# Patient Record
Sex: MALE | Race: WHITE | Employment: OTHER | URBAN - METROPOLITAN AREA
[De-identification: names, ages, dates, MRNs, and addresses within clinical notes are randomized per-mention and may not be internally consistent; named-entity substitution may affect disease eponyms.]

---

## 2018-07-20 NOTE — PROGRESS NOTES
Summary and Recommendations:     Parkinsonism - not clear if there is a specific side of onset - right possibly but he has features that appear to be more pronounced on the left.     Discussed tests  1. Dopamine scan (Datscan)  2. Brain mri  3. Neck mri    Will wait on these tests for now    Will proceed with big and loud therapy through [facility]/delano colon as it close to them as they are living in [facility]    No medication for now.     Will see again in [place]    Discussed medication class, etc.     Reji Arriaza MD  _____________________________________________________________________  PATIENT: Mervin Caraballo  77 year old male   : 1940  DEMARCO: 2018    Consult requested by PATIENT/FAMILY    Outside records reviewed and revealed ---    History obtained from patient    History of Present Illness  76 yo right handed MAN here for an evaluation for parkinson from orthopedic surgeon  Nephew is a  and thought he should be seen    Lives in [facility]    He has had a little bit of tremor in both hands. He has a bit that started in the right hand  He walks slowly  He finished his physical therapy and working on balance at home  Tries to go to the gym 3 times per week  He does shuffle possibly per family - then when asked again his wife states he does not shuffle and that he does not clearly dragged an arm or leg.   He did heavy construction bulldozer work.   He has decreased hearing  Not clear if his voice has changed.   Smell perception - has been affected  Vision - wears glasses. Left cataract surgery has been done and is going to have to right eye done  Snores at night. He has had sleep study done. He may have sleep apnea  He is not using cpap. Supposedly the apnea is marginal  Denies depression or anxiety  Skin - no cancer but may have a lesion removed from his left ear that was precancerous  Has cough with lisinopril  No infections  Denies diabetes - but may be prediabetic   5.7 A1c  Not on thyroid  Medication but on cholesterol medication  He has atrial fibrillation and is on coumadin/warfarin  Blood has been okay - 2.6 inr but is anemia 11.7 has been low in the past.   Had a colonoscopy recently and was okay  Recent lipid panel was fine on the 12th of June 2018  Prior polyp and no recent polyps  No prostate cancer and gets up 3 to 6 times per night.   No comment on prostate size  Had one fall  He has a cane for his hip issue. He had a hip fracture from a fall feb 27, 2018  This was on gravel in [place]   He as walking on a hill and lost his balance and went to grab onto an airfilter on a tractor.   Rent a house in [place] for a month or so.   He has been taking flecainide and metoprolol for his  Heart  He is taking calcium  Denies constipation and not taking a stool softener  Denies smoking for 40 yrs.   No lung disease.     Medications            Calcium carbonate 1500mg or 600mg        Flecainide tambocor 100mg        Furosemide lasix 20mg Not taking       Krill oil 500mg        Megared omega-3 krill oil 500mg or 750mg ?       Metoprolol succinate toprol-xl 25mg 24 hr tablet         MVI ocuvite Not taking       Rosuvastatin crestor 10mg        Warfarin coumadin 5mg                                                                                                                                  [facility]  Result Narrative   Clinical History: Head pain/problem.    Technique: Noncontrast axial CT images are obtained of the brain.    Comparison: None.    Findings: No hyperdense hemorrhage. Nothing for subarachnoid, subdural, or epidural blood products. No mass, mass effect, or midline shift. Mild chronic ischemic change in the deep white matter of both cerebral hemispheres is noted. No acute orbital abnormality. Mild chronic ischemic change in the periventricular deep white matter of both cerebral hemispheres is noted. Vascular  calcification. Bone algorithm was reviewed and shows no fracture of the calvarium. There is an overlapping healed fracture left zygomatic arch which has a chronic appearance. No comparison studies are available. Sinuses and mastoid air cells are clear.    Impression: No acute intracranial process. See above.  [facility]   Status         14 Review of systems  are negative except for There is no problem list on file for this patient.     Allergies not on file  No past surgical history on file.  History reviewed. No pertinent past medical history.  Social History     Social History     Marital status:      Spouse name: N/A     Number of children: N/A     Years of education: N/A     Occupational History     Not on file.     Social History Main Topics     Smoking status: Not on file     Smokeless tobacco: Not on file     Alcohol use Not on file     Drug use: Not on file     Sexual activity: Not on file     Other Topics Concern     Not on file     Social History Narrative    LIVES IN [place].  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  No current outpatient prescriptions on file.     Examination  B/P: Data Unavailable, T: Data Unavailable, P: Data Unavailable, R: Data Unavailable 0 lbs 0 oz  There were no vitals taken for this visit., There is no height or weight on file to calculate BMI.    Vitals signs were added and reviewed if not above. Please refer to the chart from this visit.    General examination: well developed, nourished and normal affect  Carotid: No bruits. Chest CTA, Heart regular without gallops or murmurs. Abdomen soft nontender, no masses, bowel sounds intact. Periphery: normal pulses without edema. No skin lesions. MENTAL STATUS:  Alert, oriented x3.  Speech fluent with normal naming, repetition, comprehension.  Good right-left orientation, Can remember 3/3 objects. DLUOW on spelling world backwards.   CRANIAL NERVES:  Disks flat. Pupils are equal, round,  reactive to light.  Normal vascularity and fields. Extraocular movements full.  Facial sensation and movement normal.  Hearing intact. Palate moves symmetrically.  Tongue midline.  Sternocleidomastoid and trapezius strength intact.  Neck strength was normal.  NEUROLOGIC:  Tone: increased in both arms and legs 2 and has bradykinesias on finger tapping bilateral. Motor in upper and lower extremities. 5/5.  Reflexes 3/4. Except absent ankles  Toe signs downgoing.  Good finger-nose-finger, fine finger movement, heel-shin maneuver, sensation to light touch, position sense and vibration and temperature was abnormal with absent vib at toes and possibly reduced at the ankle and distal pp/lt loss at ankle. Gait normal except for has some unsteadiness due to hip issue but steps are pretty good with occasional reduction in arm swing right and then left. Romberg and postural stability  Intact.  Tremor  - mild resting tremor bilateral and has a min postural and action tremor.       Answers for HPI/ROS submitted by the patient on 7/22/2018   General Symptoms: Yes  Skin Symptoms: No  HENT Symptoms: No  EYE SYMPTOMS: Yes  HEART SYMPTOMS: Yes  LUNG SYMPTOMS: Yes  INTESTINAL SYMPTOMS: No  URINARY SYMPTOMS: Yes  REPRODUCTIVE SYMPTOMS: No  SKELETAL SYMPTOMS: Yes  BLOOD SYMPTOMS: Yes  NERVOUS SYSTEM SYMPTOMS: Yes  MENTAL HEALTH SYMPTOMS: Yes  Fever: No  Loss of appetite: No  Weight loss: No  Weight gain: Yes  Fatigue: Yes  Night sweats: No  Chills: No  Increased stress: No  Excessive hunger: No  Excessive thirst: No  Feeling hot or cold when others believe the temperature is normal: No  Loss of height: No  Post-operative complications: No  Surgical site pain: No  Hallucinations: Yes  Change in or Loss of Energy: Yes  Hyperactivity: No  Confusion: Yes  Eye pain: No  Vision loss: No  Dry eyes: No  Watery eyes: No  Eye bulging: No  Double vision: Yes  Flashing of lights: No  Spots: Yes  Floaters: Yes  Redness: No  Crossed eyes: No  Tunnel  Vision: No  Yellowing of eyes: No  Eye irritation: No  Cough: No  Sputum or phlegm: No  Coughing up blood: No  Difficulty breating or shortness of breath: No  Snoring: Yes  Wheezing: No  Difficulty breathing on exertion: Yes  Nighttime Cough: No  Difficulty breathing when lying flat: No  Chest pain or pressure: No  Fast or irregular heartbeat: No  Pain in legs with walking: No  Trouble breathing while lying down: No  Fingers or toes appear blue: No  High blood pressure: Yes  Low blood pressure: No  Fainting: No  Murmurs: No  Pacemaker: No  Varicose veins: Yes  Edema or swelling: Yes  Wake up at night with shortness of breath: No  Light-headedness: No  Exercise intolerance: No  Trouble holding urine or incontinence: No  Pain or burning: No  Trouble starting or stopping: Yes  Increased frequency of urination: Yes  Blood in urine: No  Decreased frequency of urination: No  Frequent nighttime urination: Yes  Flank pain: No  Difficulty emptying bladder: Yes  Back pain: Yes  Muscle aches: No  Neck pain: Yes  Swollen joints: No  Joint pain: No  Bone pain: No  Muscle cramps: No  Muscle weakness: Yes  Joint stiffness: Yes  Bone fracture: No  Anemia: Yes  Swollen glands: No  Easy bleeding or bruising: Yes  Trouble with coordination: Yes  Dizziness or trouble with balance: Yes  Fainting or black-out spells: No  Memory loss: Yes  Headache: No  Seizures: No  Speech problems: No  Tingling: No  Tremor: Yes  Weakness: Yes  Difficulty walking: Yes  Paralysis: No  Numbness: No  Nervous or Anxious: No  Depression: No  Trouble sleeping: Yes  Trouble thinking or concentrating: Yes  Mood changes: Yes  Panic attacks: No      Patient Demographics  - 77 y.o. Male, born Dec. 06, 1940     Patient Address Communication Language Race / Ethnicity   [id number] [place] [address]   [phone] (Home)  [phone]  [email or web address]   English - Spoken (Preferred) White / Not  or    Allergies    Active Allergy Reactions  Severity Noted Date Comments   Lisinopril Cough   06/20/2011     Current Medications    Prescription Sig. Disp. Refills Start Date End Date Status   vitamin a-vitamin c-vitamin e-minerals (OCUVITE) tablet   Take 1 tablet by mouth once daily.   0 04/24/2012   Active   krill oil 500 mg cap   Take 1 tablet by mouth once daily.   0 01/03/2013   Active   calcium carbonate (CALTRATE 600) 600 mg (1,500 mg) tablet   Take 1,500 mg by mouth once daily.   0 08/14/2015   Active   rosuvastatin (CRESTOR) 10 mg tablet    Indications: Other hyperlipidemia Take 1 tablet by mouth at bedtime. 90 tablet   4 05/18/2018   Active   metoprolol succinate (TOPROL XL) 25 mg Sustained-Release tablet    Indications: HTN (hypertension) Take 1 tablet by mouth 2 times daily. 180 tablet   4 05/18/2018   Active   flecainide (TAMBOCOR) 100 mg tablet    Indications: Persistent atrial fibrillation (HC) TAKE 1 TABLET BY MOUTH EVERY 12 HOURS. 180 tablet   1 06/01/2018   Active   furosemide (LASIX) 20 mg tablet    Indications: Bilateral lower extremity edema Take 1 tablet by mouth once daily if needed for Other (Specify). 30 tablet   2 06/12/2018   Active   warfarin (COUMADIN) 5 mg tablet    Indications: Atrial fibrillation, unspecified type (HC) TAKE 1/2 TO 1 TABLET BY MOUTH DAILY OR AS DIRECTED BY INR CLINIC 90 tablet   1 07/18/2018   Active   warfarin (COUMADIN) 5 mg tablet    Indications: Atrial fibrillation, unspecified type (HC) TAKE 1/2 TO 1 TABLET BY MOUTH DAILY OR AS DIRECTED BY INR CLINIC 90 tablet   3 06/14/2017 07/18/2018 Discontinued   Active Problems    Problem Noted Date   Encounter for monitoring coumadin therapy 05/09/2018   Anticoagulant long-term use 05/02/2017   ACP (advance care planning) 07/22/2013   Overview:     Patient has identified Health Care Agent(s): No  Add Health Care Agents: No  Patient has Advance Care Plan Documents (Health Care Directive, POLST): No, .    Patient has identified Specific Treatment Preferences: No    Specific limits to treatment preferences NOT identified: ASSUME FULL TREATMENT.       MAXIME (obstructive sleep apnea)     Overview:     not using C-PAP     HTN (hypertension)     Overview:     Metoprolol     Paroxysmal atrial fibrillation (HC)     Overview:     Metoprolol, warfarin  Pulmonary vein isolation 2015  Dr Garcia     Hyperlipidemia     Overview:     Formatting of this note may be different from the original.  Crestor  Last Lipids:  Chol: 132 2018  T 2018  HDL: 45 2018  LDL: 68 2018  ALT (SGPT) (IU/L)   Date Value   2015 15        Preventive     Overview:     Formatting of this note may be different from the original.  Immunizations: see imm  Colonoscopy: , polyp, next in 5 years, advised 2017  PSA SCREEN (ng/mL)   Date Value   2006 0.45     TSH (UIU/mL)   Date Value   1/3/2013 1.62        Prediabetes     Overview:     Formatting of this note may be different from the original.  HEMOGLOBIN A1C SCREENING (%)   Date Value   2018 5.7        Osteoporosis     Overview:     Calcium, vit D  DEXA  6/15: -2.6/-1.4     Primary osteoarthritis of left knee     Overview:     Left total hip arthroplasty    17     Anemia     Overview:     Formatting of this note may be different from the original.  STFR/ferritin index 0.32 2017  B12, folic acid normal 2017  HEMOGLOBIN (g/dL)   Date Value   2018 11.7 (L)        Degenerative arthritis of lumbar spine     Overview:     X-ray lumbar spine 2017:  1. No acute findings in the lumbar spine.  2. Mild degenerative changes in the upper mid lumbar spine, stable.  3. Mild anterior wedging of the T11 and T12 vertebral bodies, stable.  4. Osteopenia.     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/2017 02/06/2017   Paroxysmal atrial fibrillation (HC) 08/26/2015 01/08/2017   Overview:     Pulmonary vein isolation 8/27/2015     Diarrhea 07/21/2013 07/31/2013   Colitis 07/21/2013 07/31/2013   Weakness 07/21/2013 07/31/2013   A-fib (HC) 04/27/2012 07/31/2013   Family history of ischemic heart disease 04/22/2009 07/31/2013   Dyslipidemia 04/22/2009 07/31/2013   MODERATE LVH BY ECHO 06/28/2007 07/31/2013   Overview:     3/06     LEFT ATRIAL ENLARGEMENT 06/28/2007 07/31/2013   Overview:     5.1 cm 3/06     Unspecified essential hypertension 06/28/2007 07/31/2013   Edema 03/24/2006 07/31/2013   Obesity, unspecified 03/24/2006 07/31/2013   Other premature beats 02/02/2006 07/31/2013   Asymptomatic varicose veins 02/02/2006 07/31/2013   inguinal pain 02/02/2006 07/31/2013   Palpitations   07/31/2013   BPH (benign prostatic hyperplasia)   06/20/2017   Overview:     Flomax started 1/2017     Edema   07/09/2018   Overview:     Compression stockings     Compression fracture   02/06/2017   Overview:     eval 6/15, negative  MRI 6/15:  Mild compression deformity at T12 with modest edema indicating   likely acute/subacute compression fracture.     Encounters  - from Last 3 Months    Date Type Specialty Care Team Description   07/18/2018 Refill Cardiology Carlyle Lehman MD   Refill Request (Warfarin)   07/09/2018 Preop Visit Internal Medicine Jenny Chew MBBS   Preoperative Exam (Bilateral cataract removal.)   06/29/2018 Anticoagulation [facility]   Anticoagulation (INR 2.4, recheck 7/27/18)   06/13/2018 Telephone Internal Medicine Jenny Chew [facility]   Questions (PRE-OP)   06/12/2018 Office Visit Internal Medicine Jenny Chew [facility]    Preoperative Exam (Bilateral cataract removal.)   06/01/2018 [facility]ticoagulation [facility]   Anticoagulation (INR: 2.4 RTC: 6/29/18)   06/01/2018 Refill Cardiology Kacey Leyva PA   Refill Request (Flecainide)   05/18/2018 Anticoagulation Cardiology [facility]   Anticoagulation (INR: 2.5 RTC: 6/1/18)   05/18/2018 Office Visit Cardiology Kacey Leyva PA   Atrial Fibrillation (1 year Follow Up)   05/09/2018 Anticoagulation Cardiology [facility]   Anticoagulation (INR 3.5, recheck 5/18/18)   05/01/2018 Anticoagulation Cardiology [facility]   Anticoagulation (INR RTC)   Immunizations  Reconcile with Patient's Chart    Name Dates Previously Given Next Due   Influenza, High-dose Inactivated 01/25/2017     Influenza, IIV3 (Age >=3 years) 12/06/2012, 01/24/2003     Influenza, Inactivated IIV3 (Age 65+ Years) Preserv Free 08/31/2017     Influenza, ccIIV3 (Age >=18 Years) 01/19/2014     Pneumococcal Poly,23-Valent (Pneumovax) 12/06/2012, 01/01/2011     Td (Age >=7 Years) 01/01/2007, 01/24/2003     Zoster (Zostavax-ZVL, live) 01/01/2012     Surgical History    Surgery Date Laterality Comments   KNEE ARTHROSCOPY 2007   right knee   ACL RECONSTRUCTION 1994   Left   EP CARDIOVERSION 06/04/12       EP CARDIOVERSION 2/4/14       EP CARDIOVERSION 4/29/14       EP STUDY /ABLATION 8/27/15   PVI cryo Dr. Lehman   KNEE REPLACEMENT 01/24/2017 Left Dr. Schwarz   HIP FRACTURE SURGERY 2/29/2018 Left     Medical History    Medical History Date Comments   Varicose vein       BPH (benign prostatic hypertrophy)       Dyslipidemia       Sleep apnea   declines cpap    Personal history of tobacco use, presenting hazards to health   quit 1967   HTN (hypertension)       LVH (left ventricular hypertrophy)       Palpitations       Foot fracture 6/4/2011 Left   Arthritis       Atrial fibrillation (HC)   s/p cardioversion   PONV (postoperative nausea and vomiting)       Family  "History    Medical History Relation Name Comments   Heart Disease Brother   dec.   Other Brother   dec.Srinivas. heart disease   Heart Disease Brother       Other Father   emphysema   Diabetes Mother   CVA   Other Other   no children   Heart Disease Sister   dec.   Unknown Sister   dec.   Good Health Sister         Relation Name Status Comments   Brother    chf   Brother   Alive     Brother        Brother        Father    emphasema   Mother    stroke   Other     no children   Other         Sister   Alive     Sister        Sister        Social History    Tobacco Use Types Packs/Day Years Used Date   Former Smoker Cigarettes 1 4 Quit: 1965   Smokeless Tobacco: Never Used           Tobacco Cessation: Counseling Given: No       Alcohol Use Drinks/Week oz/Week Comments   Yes 0 Standard drinks or equivalent   0.0  occasional beer every couple of weeks     Sex Assigned at Birth Date Recorded   Not on file         Patient Demographics  - 77 y.o. Male, born Dec. 06, 1940     Patient Address Communication Language Race / Ethnicity   [address] [address] [phone] (Home)  [phone] (Work)   Unknown Unknown / Unknown   Source Comments  - [facility]    You are receiving this document as you are listed as the primary care provider,follow-up provider, or the patient has been referred to you for consultation.This is in compliance with the Medicare and Medicaid EHR Incentive Program,which states \"Providers who transition their patient to another setting of careor provider of care or refers their patient to another provider of care shouldprovide summary care record for each transition of care or referral.\"  Allergies    Active Allergy Reactions Severity Noted Date Comments   Review Food Intolerance     2007 PN: LW FI1: food-nka     Current Medications    Prescription Sig. Disp. Refills Start Date End Date Status   unknown medication   " Indications: PN:      01/18/2008   Active   aspirin 81 MG tablet   Take 1 tablet by mouth daily (every 24 hours). LW Comment:Not Rx'd by ===> Clinician     11/02/2007   Active   lisinopril (AKA ZESTRIL) 10 MG tablet   Take 1 tablet by mouth daily (every 24 hours). LW Addl Instr:Indicated for: High Blood Pressure 90   3 10/26/2007   Active   Active Problems    Not on file    Most Recent Encounters    Date Type Specialty Care Team Description   08/29/2014 Scanning Only   Sofía Gómez MD       05/05/2011 PN Conversion Only         04/02/2008 Scanning Only   ProviderSofía MD       Social History    Tobacco Use Types Packs/Day Years Used Date   Never Assessed             Sex Assigned at Birth Date Recorded   Not on file           Answers for HPI/ROS submitted by the patient on 7/22/2018   General Symptoms: Yes  Skin Symptoms: No  HENT Symptoms: No  EYE SYMPTOMS: Yes  HEART SYMPTOMS: Yes  LUNG SYMPTOMS: Yes  INTESTINAL SYMPTOMS: No  URINARY SYMPTOMS: Yes  REPRODUCTIVE SYMPTOMS: No  SKELETAL SYMPTOMS: Yes  BLOOD SYMPTOMS: Yes  NERVOUS SYSTEM SYMPTOMS: Yes  MENTAL HEALTH SYMPTOMS: Yes  Fever: No  Loss of appetite: No  Weight loss: No  Weight gain: Yes  Fatigue: Yes  Night sweats: No  Chills: No  Increased stress: No  Excessive hunger: No  Excessive thirst: No  Feeling hot or cold when others believe the temperature is normal: No  Loss of height: No  Post-operative complications: No  Surgical site pain: No  Hallucinations: Yes  Change in or Loss of Energy: Yes  Hyperactivity: No  Confusion: Yes  Eye pain: No  Vision loss: No  Dry eyes: No  Watery eyes: No  Eye bulging: No  Double vision: Yes  Flashing of lights: No  Spots: Yes  Floaters: Yes  Redness: No  Crossed eyes: No  Tunnel Vision: No  Yellowing of eyes: No  Eye irritation: No  Cough: No  Sputum or phlegm: No  Coughing up blood: No  Difficulty breating or shortness of breath: No  Snoring: Yes  Wheezing: No  Difficulty breathing on exertion:  Yes  Nighttime Cough: No  Difficulty breathing when lying flat: No  Chest pain or pressure: No  Fast or irregular heartbeat: No  Pain in legs with walking: No  Trouble breathing while lying down: No  Fingers or toes appear blue: No  High blood pressure: Yes  Low blood pressure: No  Fainting: No  Murmurs: No  Pacemaker: No  Varicose veins: Yes  Edema or swelling: Yes  Wake up at night with shortness of breath: No  Light-headedness: No  Exercise intolerance: No  Trouble holding urine or incontinence: No  Pain or burning: No  Trouble starting or stopping: Yes  Increased frequency of urination: Yes  Blood in urine: No  Decreased frequency of urination: No  Frequent nighttime urination: Yes  Flank pain: No  Difficulty emptying bladder: Yes  Back pain: Yes  Muscle aches: No  Neck pain: Yes  Swollen joints: No  Joint pain: No  Bone pain: No  Muscle cramps: No  Muscle weakness: Yes  Joint stiffness: Yes  Bone fracture: No  Anemia: Yes  Swollen glands: No  Easy bleeding or bruising: Yes  Trouble with coordination: Yes  Dizziness or trouble with balance: Yes  Fainting or black-out spells: No  Memory loss: Yes  Headache: No  Seizures: No  Speech problems: No  Tingling: No  Tremor: Yes  Weakness: Yes  Difficulty walking: Yes  Paralysis: No  Numbness: No  Nervous or Anxious: No  Depression: No  Trouble sleeping: Yes  Trouble thinking or concentrating: Yes  Mood changes: Yes  Panic attacks: No

## 2018-07-22 ASSESSMENT — ENCOUNTER SYMPTOMS
PALPITATIONS: 0
NUMBNESS: 0
ORTHOPNEA: 0
FEVER: 0
SHORTNESS OF BREATH: 0
MEMORY LOSS: 1
SYNCOPE: 0
HEMATURIA: 0
DYSPNEA ON EXERTION: 1
NERVOUS/ANXIOUS: 0
STIFFNESS: 1
SPEECH CHANGE: 0
DECREASED APPETITE: 0
LOSS OF CONSCIOUSNESS: 0
LEG PAIN: 0
EYE REDNESS: 0
SLEEP DISTURBANCES DUE TO BREATHING: 0
SNORES LOUDLY: 1
COUGH: 0
POSTURAL DYSPNEA: 0
NECK PAIN: 1
POLYDIPSIA: 0
DEPRESSION: 0
HYPERTENSION: 1
BACK PAIN: 1
DECREASED CONCENTRATION: 1
HALLUCINATIONS: 1
NIGHT SWEATS: 0
DISTURBANCES IN COORDINATION: 1
BRUISES/BLEEDS EASILY: 1
DIFFICULTY URINATING: 1
FLANK PAIN: 0
PANIC: 0
LIGHT-HEADEDNESS: 0
EYE IRRITATION: 0
DYSURIA: 0
FATIGUE: 1
HEMOPTYSIS: 0
DOUBLE VISION: 1
POLYPHAGIA: 0
SPUTUM PRODUCTION: 0
WEAKNESS: 1
EYE WATERING: 0
INCREASED ENERGY: 1
ARTHRALGIAS: 0
WEIGHT GAIN: 1
TREMORS: 1
MUSCLE WEAKNESS: 1
EYE PAIN: 0
COUGH DISTURBING SLEEP: 0
HEADACHES: 0
MUSCLE CRAMPS: 0
HYPOTENSION: 0
WHEEZING: 0
ALTERED TEMPERATURE REGULATION: 0
TINGLING: 0
SWOLLEN GLANDS: 0
CHILLS: 0
INSOMNIA: 1
DIZZINESS: 1
EXERCISE INTOLERANCE: 0
PARALYSIS: 0
JOINT SWELLING: 0
SEIZURES: 0
WEIGHT LOSS: 0
MYALGIAS: 0

## 2018-07-25 PROBLEM — S72.009A: Status: ACTIVE | Noted: 2018-02-27

## 2018-07-25 PROBLEM — Z87.81 S/P LEFT HIP FRACTURE: Status: ACTIVE | Noted: 2018-07-25

## 2018-07-25 PROBLEM — Z51.81 ENCOUNTER FOR MONITORING COUMADIN THERAPY: Status: ACTIVE | Noted: 2018-05-09

## 2018-07-25 PROBLEM — M81.0 OSTEOPOROSIS: Status: ACTIVE | Noted: 2018-07-25

## 2018-07-25 PROBLEM — R60.0 BILATERAL LOWER EXTREMITY EDEMA: Status: ACTIVE | Noted: 2018-07-25

## 2018-07-25 PROBLEM — M47.816 DEGENERATIVE ARTHRITIS OF LUMBAR SPINE: Status: ACTIVE | Noted: 2018-07-25

## 2018-07-25 PROBLEM — I10 HTN (HYPERTENSION): Status: ACTIVE | Noted: 2018-07-25

## 2018-07-25 PROBLEM — H26.9 CATARACT: Status: ACTIVE | Noted: 2017-06-01

## 2018-07-25 PROBLEM — Z79.01 ENCOUNTER FOR MONITORING COUMADIN THERAPY: Status: ACTIVE | Noted: 2018-05-09

## 2018-07-25 PROBLEM — Z96.652 KNEE JOINT REPLACEMENT STATUS, LEFT: Status: ACTIVE | Noted: 2017-04-01

## 2018-07-25 PROBLEM — M17.12 PRIMARY OSTEOARTHRITIS OF LEFT KNEE: Status: ACTIVE | Noted: 2018-07-25

## 2018-07-25 RX ORDER — FUROSEMIDE 20 MG
TABLET ORAL
Qty: 30 TABLET | COMMUNITY
Start: 2018-07-25 | End: 2018-07-27

## 2018-07-25 RX ORDER — MV-MN/OM3/DHA/EPA/FISH/LUT/ZEA 250-5-1 MG
CAPSULE ORAL
COMMUNITY
Start: 2010-06-01 | End: 2018-07-25

## 2018-07-25 RX ORDER — ROSUVASTATIN CALCIUM 10 MG/1
TABLET, COATED ORAL
COMMUNITY
Start: 2010-06-01 | End: 2019-02-25

## 2018-07-25 RX ORDER — FUROSEMIDE 20 MG
20 TABLET ORAL
COMMUNITY
Start: 2018-06-12 | End: 2018-07-25

## 2018-07-25 RX ORDER — FLECAINIDE ACETATE 100 MG/1
TABLET ORAL
COMMUNITY
Start: 2013-06-01 | End: 2019-02-25

## 2018-07-25 RX ORDER — VITAMIN E 200 UNIT
CAPSULE ORAL
COMMUNITY
Start: 2010-06-01 | End: 2018-07-27

## 2018-07-25 RX ORDER — PHENOL 1.4 %
1500 AEROSOL, SPRAY (ML) MUCOUS MEMBRANE
COMMUNITY
Start: 2015-08-14 | End: 2018-12-21

## 2018-07-25 RX ORDER — LISINOPRIL 10 MG/1
TABLET ORAL
COMMUNITY
Start: 2007-10-26 | End: 2018-07-25

## 2018-07-25 RX ORDER — METOPROLOL SUCCINATE 25 MG/1
25 TABLET, EXTENDED RELEASE ORAL 2 TIMES DAILY
COMMUNITY
Start: 2006-06-01 | End: 2019-02-25

## 2018-07-25 RX ORDER — WARFARIN SODIUM 5 MG/1
TABLET ORAL
COMMUNITY
Start: 2018-07-18 | End: 2019-06-17

## 2018-07-25 RX ORDER — KRILL/OM-3/DHA/EPA/PHOSPHO/AST 500MG-86MG
1 CAPSULE ORAL DAILY
COMMUNITY
Start: 2018-07-25 | End: 2019-02-25

## 2018-07-27 ENCOUNTER — PRE VISIT (OUTPATIENT)
Dept: NEUROLOGY | Facility: CLINIC | Age: 78
End: 2018-07-27

## 2018-07-27 ENCOUNTER — OFFICE VISIT (OUTPATIENT)
Dept: NEUROLOGY | Facility: CLINIC | Age: 78
End: 2018-07-27
Payer: COMMERCIAL

## 2018-07-27 VITALS
HEART RATE: 86 BPM | HEIGHT: 74 IN | SYSTOLIC BLOOD PRESSURE: 161 MMHG | WEIGHT: 272.49 LBS | BODY MASS INDEX: 34.97 KG/M2 | DIASTOLIC BLOOD PRESSURE: 86 MMHG

## 2018-07-27 DIAGNOSIS — G20.C PARKINSONISM, UNSPECIFIED PARKINSONISM TYPE (H): Primary | ICD-10-CM

## 2018-07-27 DIAGNOSIS — F80.0 ARTICULATION DISORDER: ICD-10-CM

## 2018-07-27 ASSESSMENT — PAIN SCALES - GENERAL: PAINLEVEL: NO PAIN (0)

## 2018-07-27 NOTE — MR AVS SNAPSHOT
After Visit Summary   7/27/2018    Mervin Caraballo    MRN: [id number]           Patient Information     Date Of Birth          1940        Visit Information        Provider Department      7/27/2018 10:30 AM Reji Arriaza MD [facility] Neurology        Today's Diagnoses     Parkinsonism, unspecified Parkinsonism type (H)    -  1    Articulation disorder          Care Instructions      At your visit today, we discussed your risk for falls and preventive options.    Fall Prevention  Falls often occur due to slipping, tripping or losing your balance. Millions of people fall every year and injure themselves. Here are ways to reduce your risk of falling again.    Think about your fall, was there anything that caused your fall that can be fixed, removed, or replaced?    Make your home safe by keeping walkways clear of objects you may trip over.    Use non-slip pads under rugs. Do not use area rugs or small throw rugs.    Use non-slip mats in bathtubs and showers.    Install handrails and lights on staircases.    Do not walk in poorly lit areas.    Do not stand on chairs or wobbly ladders.    Use caution when reaching overhead or looking upward. This position can cause a loss of balance.    Be sure your shoes fit properly, have non-slip bottoms and are in good condition.     Wear shoes both inside and out. Avoid going barefoot or wearing slippers.    Be cautious when going up and down stairs, curbs, and when walking on uneven sidewalks.    If your balance is poor, consider using a cane or walker.    If your fall was related to alcohol use, stop or limit alcohol intake.     If your fall was related to use of sleeping medicines, talk to your doctor about this. You may need to reduce your dosage at bedtime if you awaken during the night to go to the bathroom.      To reduce the need for nighttime bathroom trips:  ? Avoid drinking fluids for several hours before going to bed  ? Empty your bladder  before going to bed  ? Men can keep a urinal at the bedside    Stay as active as you can. Balance, flexibility, strength, and endurance all come from exercise. They all play a role in preventing falls. Ask your healthcare provider which types of activity are right for you.    Get your vision checked on a regular basis.    If you have pets, know where they are before you stand up or walk so you don't trip over them.    Use night lights.  Date Last Reviewed: 11/5/2015 2000-2017 [employer]. [address] [place] [address], [place] [address]. All rights reserved. This information is not intended as a substitute for professional medical care. Always follow your healthcare professional's instructions.      Parkinsonism - not clear if there is a specific side of onset - right possibly but he has features that appear to be more pronounced on the left.     Discussed tests  1. Dopamine scan (Datscan)  2. Brain mri  3. Neck mri    Will wait on these tests for now    Will proceed with big and loud therapy through [facility] as it close to them as they are living in [facility]    No medication for now.     [place]    Discussed medication class, etc.           Follow-ups after your visit        Additional Services     PHYSICAL THERAPY REFERRAL       *This therapy referral will be filtered to a centralized scheduling office at [facility] and the patient will receive a call to schedule an appointment at a [facility] location most convenient for them. *     [facility] provides Physical Therapy evaluation and treatment and many specialty services across the [facility] system.  If requesting a specialty program, please choose from the list below.    If you have not heard from the scheduling office within 2 business days, please call [phone] for all locations, with the exception of [place], please call [phone] and [place]  "please call [phone]  Treatment: Evaluation & Treatment  Special Instructions/Modalities: evaluate and treat  Special Programs: Parkinsons Rehabilitation - Big and Loud     Please be aware that coverage of these services is subject to the terms and limitations of your health insurance plan.  Call member services at your health plan with any benefit or coverage questions.      **Note to Provider:  If you are referring outside of [facility] for the therapy appointment, please list the name of the location in the \"special instructions\" above, print the referral and give to the patient to schedule the appointment.            SPEECH THERAPY REFERRAL       *This therapy referral will be filtered to a centralized scheduling office at [facility] and the patient will receive a call to schedule an appointment at a [facility] location most convenient for them. *     [facility] provides Speech Therapy evaluation and treatment and many specialty services across the [facility] system.  If requesting a specialty program, please choose from the list below.  If you have not heard from the scheduling office within 2 business days, please call [phone] for all locations, with the exception of [place], please call [phone] and [facility], please call [phone]      Treatment: Evaluation & Treatment  Speech Treatment Diagnosis: Problems With Voice Production  Special Instructions: evaluate and treat  Special Programs: Voice     Please be aware that coverage of these services is subject to the terms and limitations of your health insurance plan.  Call member services at your health plan with any benefit or coverage questions.      **Note to Provider:  If you are referring outside of [facility] for the therapy appointment, please list the name of the location in the \"special instructions\" above, print the referral and give to the patient to schedule the appointment.                " "  Follow-up notes from your care team     Return in about 6 months (around 1/27/2019).      Your next 10 appointments already scheduled     Dec 10, 2018 10:30 AM CST   Return Visit with Reji Arriaza MD   [facility]    [address] [place] [id number] [phone]              Who to contact     Please call your clinic at [phone] to:    Ask questions about your health    Make or cancel appointments    Discuss your medicines    Learn about your test results    Speak to your doctor            Additional Information About Your Visit        [employer] Information     [employer] gives you secure access to your electronic health record. If you see a primary care provider, you can also send messages to your care team and make appointments. If you have questions, please call your primary care clinic.  If you do not have a primary care provider, please call [phone] and they will assist you.      [employer] is an electronic gateway that provides easy, online access to your medical records. With [employer], you can request a clinic appointment, read your test results, renew a prescription or communicate with your care team.     To access your existing account, please contact your [facility] or call [phone] for assistance.        Care EveryWhere ID     This is your Care EveryWhere ID. This could be used by other organizations to access your [facility] medical records  [id number]        Your Vitals Were     Pulse Height BMI (Body Mass Index)             86 1.88 m (6' 2\") 34.99 kg/m2          Blood Pressure from Last 3 Encounters:   07/27/18 161/86    Weight from Last 3 Encounters:   07/27/18 123.6 kg (272 lb 7.8 oz)              We Performed the Following     PHYSICAL THERAPY REFERRAL     SPEECH THERAPY REFERRAL          Today's Medication Changes          These changes are accurate as of 7/27/18 11:50 AM.  If you have any " questions, ask your nurse or doctor.               These medicines have changed or have updated prescriptions.        Dose/Directions    warfarin 5 MG tablet   Commonly known as:  COUMADIN   This may have changed:  Another medication with the same name was removed. Continue taking this medication, and follow the directions you see here.   Changed by:  Reji Arriaza MD        TAKE 1/2 TO 1 TABLET BY MOUTH DAILY OR AS DIRECTED BY INR CLINIC   Refills:  0         Stop taking these medicines if you haven't already. Please contact your care team if you have questions.     CALTRATE 600+D3 SOFT 600-800 MG-UNIT Chew   Generic drug:  Calcium Carb-Cholecalciferol   Stopped by:  Reji Arriaza MD           furosemide 20 MG tablet   Commonly known as:  LASIX   Stopped by:  Reji Arriaza MD           lisinopril 10 MG tablet   Commonly known as:  PRINIVIL/ZESTRIL   Stopped by:  Reji Arriaza MD           OCUVITE ADULT 50+ Caps   Stopped by:  Reji Arriaza MD                    Primary Care Provider Office Phone # Fax #    [facility] Jeevan [phone]       [facility] [address] [place] [address]        Equal Access to Services     [facility]: Hadii aad ku hadasho Soomaali, waaxda luqadaha, qaybta kaalmada adeegyada, shweta basilio . So [facility] [phone].    ATENCIÓN: Si habla español, tiene a terrell disposición servicios gratuitos de asistencia lingüística. [facility] [phone].    We comply with applicable federal civil rights laws and [place] laws. We do not discriminate on the basis of race, color, national origin, age, disability, sex, sexual orientation, or gender identity.            Thank you!     Thank you for choosing [facility] NEUROLOGY  for your care. Our goal is always to provide you with excellent care. Hearing back from our patients is one way we can continue to improve our services. Please take a few minutes to  complete the written survey that you may receive in the mail after your visit with us. Thank you!             Your Updated Medication List - Protect others around you: Learn how to safely use, store and throw away your medicines at www.disposemymeds.org.          This list is accurate as of 7/27/18 11:50 AM.  Always use your most recent med list.                   Brand Name Dispense Instructions for use Diagnosis    calcium carbonate 600 MG tablet   Generic drug:  calcium carbonate      Take 1,500 mg by mouth        flecainide 100 MG tablet    TAMBOCOR     100mg tab by mouth twice daily        Krill Oil 500 MG Caps           metoprolol succinate 25 MG 24 hr tablet    TOPROL-XL     25mg tab by mouth twice ? daily        rosuvastatin 10 MG tablet    CRESTOR     1/2 of 10mg by mouth once daily        warfarin 5 MG tablet    COUMADIN     TAKE 1/2 TO 1 TABLET BY MOUTH DAILY OR AS DIRECTED BY INR CLINIC

## 2018-07-27 NOTE — Clinical Note
7/27/2018       RE: Mervin Caraballo  [address]  [place] [address]     Dear Colleague,    Thank you for referring your patient, Mervin Caraballo, to the [facility] NEUROLOGY at [facility]. Please see a copy of my visit note below.    No notes on file    Again, thank you for allowing me to participate in the care of your patient.      Sincerely,    Reji Arriaza MD

## 2018-07-27 NOTE — LETTER
2018       RE: Mervin Caraballo  [address]  [facility] [address]     Dear Colleague,    Thank you for referring your patient, Mervin Caraballo, to the [facility] NEUROLOGY at [facility]. Please see a copy of my visit note below.    Summary and Recommendations:     Parkinsonism - not clear if there is a specific side of onset - right possibly but he has features that appear to be more pronounced on the left.     Discussed tests  1. Dopamine scan (Datscan)  2. Brain mri  3. Neck mri    Will wait on these tests for now    Will proceed [facility] them as they are living in [facility]    No medication for now.     [place]    Discussed medication class, etc.     Reji Arriaza MD  _____________________________________________________________________  PATIENT: Mevrin Caraballo  77 year old male   : 1940  [place]: 2018    Consult requested by PATIENT/FAMILY    Outside records reviewed and revealed ---    History obtained from patient    History of Present Illness  76 yo right handed MAN here for an evaluation for parkinson from orthopedic surgeon  Nephew is a  and thought he should be seen    Lives in [facility]    He has had a little bit of tremor in both hands. He has a bit that started in the right hand  He walks slowly  He finished his physical therapy and working on balance at home  Tries to go to the gym 3 times per week  He does shuffle possibly per family - then when asked again his wife states he does not shuffle and that he does not clearly dragged an arm or leg.   He did heavy construction bulldozer work.   He has decreased hearing  Not clear if his voice has changed.   Smell perception - has been affected  Vision - wears glasses. Left cataract surgery has been done and is going to have to right eye done  Snores at night. He has had sleep study done. He  may have sleep apnea  He is not using cpap. Supposedly the apnea is marginal  Denies depression or anxiety  Skin - no cancer but may have a lesion removed from his left ear that was precancerous  Has cough with lisinopril  No infections  Denies diabetes - but may be prediabetic  5.7 A1c  Not on thyroid  Medication but on cholesterol medication  He has atrial fibrillation and is on coumadin/warfarin  Blood has been okay - 2.6 inr but is anemia 11.7 has been low in the past.   Had a colonoscopy recently and was okay  Recent lipid panel was fine on the 12th of June 2018  Prior polyp and no recent polyps  No prostate cancer and gets up 3 to 6 times per night.   No comment on prostate size  Had one fall  He has a cane for his hip issue. He had a hip fracture from a fall feb 27, 2018  This was on gravel in [place]   He as walking on a hill and lost his balance and went to grab onto an airfilter on a tractor.   Rent a house in [place] for a month or so.   He has been taking flecainide and metoprolol for his  Heart  He is taking calcium  Denies constipation and not taking a stool softener  Denies smoking for 40 yrs.   No lung disease.     Medications            Calcium carbonate 1500mg or 600mg        Flecainide tambocor 100mg        Furosemide lasix 20mg Not taking       Krill oil 500mg        Megared omega-3 krill oil 500mg or 750mg ?       Metoprolol succinate toprol-xl 25mg 24 hr tablet         MVI ocuvite Not taking       Rosuvastatin crestor 10mg        Warfarin coumadin 5mg                                                                                                                                  [facility]  Result Narrative   Clinical History: Head pain/problem.    Technique: Noncontrast axial CT images are obtained of the brain.    Comparison: None.    Findings: No hyperdense hemorrhage. Nothing for subarachnoid, subdural, or epidural blood products. No  mass, mass effect, or midline shift. Mild chronic ischemic change in the deep white matter of both cerebral hemispheres is noted. No acute orbital abnormality. Mild chronic ischemic change in the periventricular deep white matter of both cerebral hemispheres is noted. Vascular calcification. Bone algorithm was reviewed and shows no fracture of the calvarium. There is an overlapping healed fracture left zygomatic arch which has a chronic appearance. No comparison studies are available. Sinuses and mastoid air cells are clear.    Impression: No acute intracranial process. See above.  [facility]   Status         14 Review of systems  are negative except for There is no problem list on file for this patient.     Allergies not on file  No past surgical history on file.  History reviewed. No pertinent past medical history.  Social History     Social History     Marital status:      Spouse name: N/A     Number of children: N/A     Years of education: N/A     Occupational History     Not on file.     Social History Main Topics     Smoking status: Not on file     Smokeless tobacco: Not on file     Alcohol use Not on file     Drug use: Not on file     Sexual activity: Not on file     Other Topics Concern     Not on file     Social History Narrative    LIVES IN [place].  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  No current outpatient prescriptions on file.     Examination  B/P: Data Unavailable, T: Data Unavailable, P: Data Unavailable, R: Data Unavailable 0 lbs 0 oz  There were no vitals taken for this visit., There is no height or weight on file to calculate BMI.    Vitals signs were added and reviewed if not above. Please refer to the chart from this visit.    General examination: well developed, nourished and normal affect  Carotid: No bruits. Chest CTA, Heart regular without gallops or murmurs. Abdomen soft nontender, no masses, bowel sounds intact. Periphery: normal pulses  without edema. No skin lesions. MENTAL STATUS:  Alert, oriented x3.  Speech fluent with normal naming, repetition, comprehension.  Good right-left orientation, Can remember 3/3 objects. DLUOW on spelling world backwards.   CRANIAL NERVES:  Disks flat. Pupils are equal, round, reactive to light.  Normal vascularity and fields. Extraocular movements full.  Facial sensation and movement normal.  Hearing intact. Palate moves symmetrically.  Tongue midline.  Sternocleidomastoid and trapezius strength intact.  Neck strength was normal.  NEUROLOGIC:  Tone: increased in both arms and legs 2 and has bradykinesias on finger tapping bilateral. Motor in upper and lower extremities. 5/5.  Reflexes 3/4. Except absent ankles  Toe signs downgoing.  Good finger-nose-finger, fine finger movement, heel-shin maneuver, sensation to light touch, position sense and vibration and temperature was abnormal with absent vib at toes and possibly reduced at the ankle and distal pp/lt loss at ankle. Gait normal except for has some unsteadiness due to hip issue but steps are pretty good with occasional reduction in arm swing right and then left. Romberg and postural stability  Intact.  Tremor  - mild resting tremor bilateral and has a min postural and action tremor.       Answers for HPI/ROS submitted by the patient on 7/22/2018   General Symptoms: Yes  Skin Symptoms: No  HENT Symptoms: No  EYE SYMPTOMS: Yes  HEART SYMPTOMS: Yes  LUNG SYMPTOMS: Yes  INTESTINAL SYMPTOMS: No  URINARY SYMPTOMS: Yes  REPRODUCTIVE SYMPTOMS: No  SKELETAL SYMPTOMS: Yes  BLOOD SYMPTOMS: Yes  NERVOUS SYSTEM SYMPTOMS: Yes  MENTAL HEALTH SYMPTOMS: Yes  Fever: No  Loss of appetite: No  Weight loss: No  Weight gain: Yes  Fatigue: Yes  Night sweats: No  Chills: No  Increased stress: No  Excessive hunger: No  Excessive thirst: No  Feeling hot or cold when others believe the temperature is normal: No  Loss of height: No  Post-operative complications: No  Surgical site pain:  No  Hallucinations: Yes  Change in or Loss of Energy: Yes  Hyperactivity: No  Confusion: Yes  Eye pain: No  Vision loss: No  Dry eyes: No  Watery eyes: No  Eye bulging: No  Double vision: Yes  Flashing of lights: No  Spots: Yes  Floaters: Yes  Redness: No  Crossed eyes: No  Tunnel Vision: No  Yellowing of eyes: No  Eye irritation: No  Cough: No  Sputum or phlegm: No  Coughing up blood: No  Difficulty breating or shortness of breath: No  Snoring: Yes  Wheezing: No  Difficulty breathing on exertion: Yes  Nighttime Cough: No  Difficulty breathing when lying flat: No  Chest pain or pressure: No  Fast or irregular heartbeat: No  Pain in legs with walking: No  Trouble breathing while lying down: No  Fingers or toes appear blue: No  High blood pressure: Yes  Low blood pressure: No  Fainting: No  Murmurs: No  Pacemaker: No  Varicose veins: Yes  Edema or swelling: Yes  Wake up at night with shortness of breath: No  Light-headedness: No  Exercise intolerance: No  Trouble holding urine or incontinence: No  Pain or burning: No  Trouble starting or stopping: Yes  Increased frequency of urination: Yes  Blood in urine: No  Decreased frequency of urination: No  Frequent nighttime urination: Yes  Flank pain: No  Difficulty emptying bladder: Yes  Back pain: Yes  Muscle aches: No  Neck pain: Yes  Swollen joints: No  Joint pain: No  Bone pain: No  Muscle cramps: No  Muscle weakness: Yes  Joint stiffness: Yes  Bone fracture: No  Anemia: Yes  Swollen glands: No  Easy bleeding or bruising: Yes  Trouble with coordination: Yes  Dizziness or trouble with balance: Yes  Fainting or black-out spells: No  Memory loss: Yes  Headache: No  Seizures: No  Speech problems: No  Tingling: No  Tremor: Yes  Weakness: Yes  Difficulty walking: Yes  Paralysis: No  Numbness: No  Nervous or Anxious: No  Depression: No  Trouble sleeping: Yes  Trouble thinking or concentrating: Yes  Mood changes: Yes  Panic attacks: No      Patient Demographics  - 77 y.o. Male,  born Dec. 06, 1940     Patient Address Communication Language Race / Ethnicity   [address] [address]   [phone] (Home)  [phone]  [email or web address]   English - Spoken (Preferred) White / Not  or    Allergies    Active Allergy Reactions Severity Noted Date Comments   Lisinopril Cough   06/20/2011     Current Medications    Prescription Sig. Disp. Refills Start Date End Date Status   vitamin a-vitamin c-vitamin e-minerals (OCUVITE) tablet   Take 1 tablet by mouth once daily.   0 04/24/2012   Active   krill oil 500 mg cap   Take 1 tablet by mouth once daily.   0 01/03/2013   Active   calcium carbonate (CALTRATE 600) 600 mg (1,500 mg) tablet   Take 1,500 mg by mouth once daily.   0 08/14/2015   Active   rosuvastatin (CRESTOR) 10 mg tablet    Indications: Other hyperlipidemia Take 1 tablet by mouth at bedtime. 90 tablet   4 05/18/2018   Active   metoprolol succinate (TOPROL XL) 25 mg Sustained-Release tablet    Indications: HTN (hypertension) Take 1 tablet by mouth 2 times daily. 180 tablet   4 05/18/2018   Active   flecainide (TAMBOCOR) 100 mg tablet    Indications: Persistent atrial fibrillation (HC) TAKE 1 TABLET BY MOUTH EVERY 12 HOURS. 180 tablet   1 06/01/2018   Active   furosemide (LASIX) 20 mg tablet    Indications: Bilateral lower extremity edema Take 1 tablet by mouth once daily if needed for Other (Specify). 30 tablet   2 06/12/2018   Active   warfarin (COUMADIN) 5 mg tablet    Indications: Atrial fibrillation, unspecified type (HC) TAKE 1/2 TO 1 TABLET BY MOUTH DAILY OR AS DIRECTED BY INR CLINIC 90 tablet   1 07/18/2018   Active   warfarin (COUMADIN) 5 mg tablet    Indications: Atrial fibrillation, unspecified type (HC) TAKE 1/2 TO 1 TABLET BY MOUTH DAILY OR AS DIRECTED BY INR CLINIC 90 tablet   3 06/14/2017 07/18/2018 Discontinued   Active Problems    Problem Noted Date   Encounter for monitoring coumadin therapy 05/09/2018   Anticoagulant long-term use  2017   ACP (advance care planning) 2013   Overview:     Patient has identified Health Care Agent(s): No  Add Health Care Agents: No  Patient has Advance Care Plan Documents (Health Care Directive, POLST): No, .    Patient has identified Specific Treatment Preferences: No   Specific limits to treatment preferences NOT identified: ASSUME FULL TREATMENT.       MAXIME (obstructive sleep apnea)     Overview:     not using C-PAP     HTN (hypertension)     Overview:     Metoprolol     Paroxysmal atrial fibrillation (HC)     Overview:     Metoprolol, warfarin  Pulmonary vein isolation 2015  Dr Garcia     Hyperlipidemia     Overview:     Formatting of this note may be different from the original.  Crestor  Last Lipids:  Chol: 132 2018  T 2018  HDL: 45 2018  LDL: 68 2018  ALT (SGPT) (IU/L)   Date Value   2015 15        Preventive     Overview:     Formatting of this note may be different from the original.  Immunizations: see imm  Colonoscopy: 2010, polyp, next in 5 years, advised 2017  PSA SCREEN (ng/mL)   Date Value   2006 0.45     TSH (UIU/mL)   Date Value   1/3/2013 1.62        Prediabetes     Overview:     Formatting of this note may be different from the original.  HEMOGLOBIN A1C SCREENING (%)   Date Value   2018 5.7        Osteoporosis     Overview:     Calcium, vit D  DEXA  6/15: -2.6/-1.4     Primary osteoarthritis of left knee     Overview:     Left total hip arthroplasty    17     Anemia     Overview:     Formatting of this note may be different from the original.  STFR/ferritin index 0.32 2017  B12, folic acid normal 2017  HEMOGLOBIN (g/dL)   Date Value   2018 11.7 (L)        Degenerative arthritis of lumbar spine     Overview:     X-ray lumbar spine 2017:  1. No acute findings in the lumbar spine.  2. Mild degenerative changes in the upper mid lumbar spine, stable.  3. Mild anterior wedging of the T11 and T12 vertebral bodies,  stable.  4. Osteopenia.     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/24/2017 02/06/2017   Paroxysmal atrial fibrillation (HC) 08/26/2015 01/08/2017   Overview:     Pulmonary vein isolation 8/27/2015     Diarrhea 07/21/2013 07/31/2013   Colitis 07/21/2013 07/31/2013   Weakness 07/21/2013 07/31/2013   A-fib (HC) 04/27/2012 07/31/2013   Family history of ischemic heart disease 04/22/2009 07/31/2013   Dyslipidemia 04/22/2009 07/31/2013   MODERATE LVH BY ECHO 06/28/2007 07/31/2013   Overview:     3/06     LEFT ATRIAL ENLARGEMENT 06/28/2007 07/31/2013   Overview:     5.1 cm 3/06     Unspecified essential hypertension 06/28/2007 07/31/2013   Edema 03/24/2006 07/31/2013   Obesity, unspecified 03/24/2006 07/31/2013   Other premature beats 02/02/2006 07/31/2013   Asymptomatic varicose veins 02/02/2006 07/31/2013   inguinal pain 02/02/2006 07/31/2013   Palpitations   07/31/2013   BPH (benign prostatic hyperplasia)   06/20/2017   Overview:     Flomax started 1/2017     Edema   07/09/2018   Overview:     Compression stockings     Compression fracture   02/06/2017   Overview:     eval 6/15, negative  MRI 6/15:  Mild compression deformity at T12 with modest edema indicating   likely acute/subacute compression fracture.     Encounters  - from Last 3 Months    Date Type Specialty Care Team Description   07/18/2018 Refill Cardiology Remole, Carlyle Liang MD   Refill Request (Warfarin)   07/09/2018 Preop Visit Internal Medicine Jenny Chew MBBS   Preoperative Exam (Bilateral  cataract removal.)   06/29/2018 Anticoagulation [facility]iology [facility]   Anticoagulation (INR 2.4, recheck 7/27/18)   06/13/2018 Telephone Internal Medicine Jenny Chew MBBS   Questions (PRE-OP)   06/12/2018 Office Visit Internal Medicine Jenny Chew MBBS   Preoperative Exam (Bilateral cataract removal.)   06/01/2018 Anticoagulation [facility]iology [facility]   Anticoagulation (INR: 2.4 RTC: 6/29/18)   06/01/2018 Refill Cardiology Kacey Leyva PA   Refill Request (Flecainide)   05/18/2018 Anticoagulation Cardiology [facility]   Anticoagulation (INR: 2.5 RTC: 6/1/18)   05/18/2018 Office Visit Cardiology Kacey Leyva PA   Atrial Fibrillation (1 year Follow Up)   05/09/2018 Anticoagulation [facility]iology [facility]   Anticoagulation (INR 3.5, recheck 5/18/18)   05/01/2018 Anticoagulation Cardiology [facility], [facility]   Anticoagulation (INR RTC)   Immunizations  Reconcile with Patient's Chart    Name Dates Previously Given Next Due   Influenza, High-dose Inactivated 01/25/2017     Influenza, IIV3 (Age >=3 years) 12/06/2012, 01/24/2003     Influenza, Inactivated IIV3 (Age 65+ Years) Preserv Free 08/31/2017     Influenza, ccIIV3 (Age >=18 Years) 01/19/2014     Pneumococcal Poly,23-Valent (Pneumovax) 12/06/2012, 01/01/2011     Td (Age >=7 Years) 01/01/2007, 01/24/2003     Zoster (Zostavax-ZVL, live) 01/01/2012     Surgical History    Surgery Date Laterality Comments   KNEE ARTHROSCOPY 2007   right knee   ACL RECONSTRUCTION 1994   Left   EP CARDIOVERSION 06/04/12       EP CARDIOVERSION 2/4/14       EP CARDIOVERSION 4/29/14       EP STUDY /ABLATION 8/27/15   PVI cryo Dr. Lehman   KNEE REPLACEMENT 01/24/2017 Left Dr. Schwarz   HIP FRACTURE SURGERY 2/29/2018 Left     Medical History    Medical History Date Comments   Varicose vein       BPH (benign prostatic hypertrophy)       Dyslipidemia       Sleep apnea   declines cpap    Personal history of tobacco use, presenting  "hazards to health   quit    HTN (hypertension)       LVH (left ventricular hypertrophy)       Palpitations       Foot fracture 2011 Left   Arthritis       Atrial fibrillation (HC)   s/p cardioversion   PONV (postoperative nausea and vomiting)       Family History    Medical History Relation Name Comments   Heart Disease Brother   dec.   Other Brother   dec.Srinivas. heart disease   Heart Disease Brother       Other Father   emphysema   Diabetes Mother   CVA   Other Other   no children   Heart Disease Sister   dec.   Unknown Sister   dec.   Good Health Sister         Relation Name Status Comments   Brother    chf   Brother   Alive     Brother        Brother        Father    emphasema   Mother    stroke   Other     no children   Other         Sister   Alive     Sister        Sister        Social History    Tobacco Use Types Packs/Day Years Used Date   Former Smoker Cigarettes 1 4 Quit: 1965   Smokeless Tobacco: Never Used           Tobacco Cessation: Counseling Given: No       Alcohol Use Drinks/Week oz/Week Comments   Yes 0 Standard drinks or equivalent   0.0  occasional beer every couple of weeks     Sex Assigned at Birth Date Recorded   Not on file         Patient Demographics  - 77 y.o. Male, born Dec. 06, 1940     Patient Address Communication Language Race / Ethnicity   [address] [address] [phone] (Home)  [phone] (Work)   Unknown Unknown / Unknown   Source Comments  - [facility]    You are receiving this document as you are listed as the primary care provider,follow-up provider, or the patient has been referred to you for consultation.This is in compliance with the Medicare and Medicaid EHR Incentive Program,which states \"Providers who transition their patient to another setting of careor provider of care or refers their patient to another provider of care shouldprovide summary care record for each transition " "of care or referral.\"  Allergies    Active Allergy Reactions Severity Noted Date Comments   Review Food Intolerance     11/02/2007 PN: LW FI1: food-nka     Current Medications    Prescription Sig. Disp. Refills Start Date End Date Status   unknown medication   Indications: PN:      01/18/2008   Active   aspirin 81 MG tablet   Take 1 tablet by mouth daily (every 24 hours). LW Comment:Not Rx'd by ===> Clinician     11/02/2007   Active   lisinopril (AKA ZESTRIL) 10 MG tablet   Take 1 tablet by mouth daily (every 24 hours). LW Addl Instr:Indicated for: High Blood Pressure 90   3 10/26/2007   Active   Active Problems    Not on file    Most Recent Encounters    Date Type Specialty Care Team Description   08/29/2014 Scanning Only   Sofía Gómez MD       05/05/2011 PN Conversion Only         04/02/2008 Scanning Only   ProviderSofía MD       Social History    Tobacco Use Types Packs/Day Years Used Date   Never Assessed             Sex Assigned at Birth Date Recorded   Not on file         Again, thank you for allowing me to participate in the care of your patient.      Sincerely,    Reji Arriaza MD    "

## 2018-07-27 NOTE — PATIENT INSTRUCTIONS
At your visit today, we discussed your risk for falls and preventive options.    Fall Prevention  Falls often occur due to slipping, tripping or losing your balance. Millions of people fall every year and injure themselves. Here are ways to reduce your risk of falling again.    Think about your fall, was there anything that caused your fall that can be fixed, removed, or replaced?    Make your home safe by keeping walkways clear of objects you may trip over.    Use non-slip pads under rugs. Do not use area rugs or small throw rugs.    Use non-slip mats in bathtubs and showers.    Install handrails and lights on staircases.    Do not walk in poorly lit areas.    Do not stand on chairs or wobbly ladders.    Use caution when reaching overhead or looking upward. This position can cause a loss of balance.    Be sure your shoes fit properly, have non-slip bottoms and are in good condition.     Wear shoes both inside and out. Avoid going barefoot or wearing slippers.    Be cautious when going up and down stairs, curbs, and when walking on uneven sidewalks.    If your balance is poor, consider using a cane or walker.    If your fall was related to alcohol use, stop or limit alcohol intake.     If your fall was related to use of sleeping medicines, talk to your doctor about this. You may need to reduce your dosage at bedtime if you awaken during the night to go to the bathroom.      To reduce the need for nighttime bathroom trips:  ? Avoid drinking fluids for several hours before going to bed  ? Empty your bladder before going to bed  ? Men can keep a urinal at the bedside    Stay as active as you can. Balance, flexibility, strength, and endurance all come from exercise. They all play a role in preventing falls. Ask your healthcare provider which types of activity are right for you.    Get your vision checked on a regular basis.    If you have pets, know where they are before you stand up or walk so you don't trip over  them.    Use night lights.  Date Last Reviewed: 11/5/2015 2000-2017 [employer]. [address], [place] [address]. All rights reserved. This information is not intended as a substitute for professional medical care. Always follow your healthcare professional's instructions.      Parkinsonism - not clear if there is a specific side of onset - right possibly but he has features that appear to be more pronounced on the left.     Discussed tests  1. Dopamine scan (Datscan)  2. Brain mri  3. Neck mri    Will wait on these tests for now    [facility] as they are living in [facility]    No medication for now.     [place] again in [place]    Discussed medication class, etc.

## 2018-08-23 ENCOUNTER — MEDICAL CORRESPONDENCE (OUTPATIENT)
Dept: HEALTH INFORMATION MANAGEMENT | Facility: CLINIC | Age: 78
End: 2018-08-23

## 2018-12-08 NOTE — PROGRESS NOTES
Diagnosis/Summary/Recommendations:    PATIENT: Mervin Caraballo  78 year old male     : 1940    DEMARCO: December  10, 2018    Parkinsonism    Atrial fibrillation - may be a candidate for a device to prevent emboli and allow him to go off coumadin given his parkinson and fall risk.     9/15/2018  Head ct when he had his fall    Impression:   1. No fracture or hemorrhage.  2. Small vessel ischemic disease.  [facility]    Not sure if he is eligible for mt consultation for patt Salgado.    He has interrupted sleep  Goes to sleep around 10pm  His wife sleeps separately.   He wakes during the night 5-6 times per night or at least 4 times per night.  He may have night maier or ?   Hears people knocking on the doors  He has been forgetful    He has sleep apnea and not using cpap.    He has an enlarged prostate and had an evaluation long ago.   He has not seen urology in a while.   He may have been on flomax in the past.        Medications                 Calcium carbonate 1500mg or 600mg             Flecainide tambocor 100mg             Furosemide lasix 20mg Not taking           Krill oil 500mg             Megared omega-3 krill oil 500mg or 750mg ?           Metoprolol succinate toprol-xl 25mg 24 hr tablet              MVI ocuvite 1           Rosuvastatin crestor 10mg             Warfarin coumadin 5mg                                         History obtained from patient     PLAN  Pharmacy consultation with patt Salgado, Pharm D  Consideration for trial of sinemet carbidopa/levodopa   Explained possible side effects of medications and schedule.  Return back in 3months.       May want to review his prostate/bladder issues with his pcp or/and urology consultation      rem behavioral sleep disorder  Consider use of melatonin at night starting with 3 or 5mg before bed time    Ongoing sleep issues related to sleep apnea, nocturia, and rem behavioral sleep disroder    A fib and anticoagulation issue  being discussed.       Coding statement:   Duration of  Services: patient care and care coordination was 25 minutes  Greater than 50% of this visit was spent in counseling and coordination of care.     Reji Arriaza MD     ______________________________________    Last visit date and details:      Parkinsonism - not clear if there is a specific side of onset - right possibly but he has features that appear to be more pronounced on the left.      Discussed tests  1. Dopamine scan (Datscan)  2. Brain mri  3. Neck mri     Will wait on these tests for now     Will proceed with big and loud therapy through [facility] as it close to them as they are living in [facility]     No medication for now.      Will see again in [place]     Discussed medication class, etc.            ______________________________________      Patient was asked about 14 Review of systems including changes in vision (dry eyes, double vision), hearing, heart, lungs, musculoskeletal, depression, anxiety, snoring, RBD, insomnia, urinary frequency, urinary urgency, constipation, swallowing problems, hematological, ID, allergies, skin problems: seborrhea, endocrinological: thyroid, diabetes, cholesterol; balance, weight changes, and other neurological problems and these were not significant at this time except for   Patient Active Problem List   Diagnosis     ACP (advance care planning)     Anemia     Anticoagulant long-term use     Atrial fibrillation, currently in sinus rhythm     Bilateral lower extremity edema     Broken hip (H)     Cataract     Degenerative arthritis of lumbar spine     Encounter for monitoring Coumadin therapy     High blood pressure     HTN (hypertension)     Hyperlipidemia     Knee joint replacement status, left     MAXIME (obstructive sleep apnea)     Osteoporosis     Paroxysmal atrial fibrillation (H)     Prediabetes     Routine general medical examination at a health care facility     Primary  osteoarthritis of left knee     S/p left hip fracture          Allergies   Allergen Reactions     Lisinopril Cough     No Clinical Screening - See Comments      PN: LW FI1: food-nka     Past Surgical History:   Procedure Laterality Date     ARTHROSCOPY KNEE Right      ep cardioversion 2012, 2014, 2014 and 2015       ep studya/ablation 2015       EYE SURGERY Left     cataract left     HIP SURGERY Left 2018    fracture surgery     KNEE SURGERY Left     reconstruction     KNEE SURGERY Left 2017    replacement 2017     No past medical history on file.  Social History     Social History     Marital status:      Spouse name: N/A     Number of children: N/A     Years of education: N/A     Occupational History     Not on file.     Social History Main Topics     Smoking status: Former Smoker     Smokeless tobacco: Never Used      Comment: quit 1965     Alcohol use Yes     Drug use: Not on file     Sexual activity: Not on file     Other Topics Concern     Not on file     Social History Narrative    LIVES IN [place].  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  Medications and Vitals not listed above were documented in the cart and reviewed by me.     Current Outpatient Prescriptions   Medication Sig Dispense Refill     calcium carbonate (CALCIUM CARBONATE) 600 MG tablet Take 1,500 mg by mouth       flecainide (TAMBOCOR) 100 MG tablet 100mg tab by mouth twice daily       Krill Oil 500 MG CAPS        metoprolol succinate (TOPROL-XL) 25 MG 24 hr tablet 25mg tab by mouth twice ? daily       rosuvastatin (CRESTOR) 10 MG tablet 1/2 of 10mg by mouth once daily       warfarin (COUMADIN) 5 MG tablet TAKE 1/2 TO 1  TABLET BY MOUTH DAILY OR AS DIRECTED BY INR CLINIC           Reji Arriaza MD

## 2018-12-10 ENCOUNTER — TELEPHONE (OUTPATIENT)
Dept: NEUROLOGY | Facility: CLINIC | Age: 78
End: 2018-12-10

## 2018-12-10 ENCOUNTER — OFFICE VISIT (OUTPATIENT)
Dept: NEUROLOGY | Facility: CLINIC | Age: 78
End: 2018-12-10
Payer: COMMERCIAL

## 2018-12-10 VITALS
BODY MASS INDEX: 34.31 KG/M2 | WEIGHT: 267.2 LBS | HEART RATE: 64 BPM | SYSTOLIC BLOOD PRESSURE: 144 MMHG | DIASTOLIC BLOOD PRESSURE: 84 MMHG | OXYGEN SATURATION: 96 %

## 2018-12-10 DIAGNOSIS — G20.C PARKINSONISM, UNSPECIFIED PARKINSONISM TYPE (H): Primary | ICD-10-CM

## 2018-12-10 DIAGNOSIS — R35.1 NOCTURIA: ICD-10-CM

## 2018-12-10 PROBLEM — G20.A1 PARKINSON'S DISEASE (H): Status: ACTIVE | Noted: 2018-12-10

## 2018-12-10 PROCEDURE — 99214 OFFICE O/P EST MOD 30 MIN: CPT | Performed by: PSYCHIATRY & NEUROLOGY

## 2018-12-10 RX ORDER — CARBIDOPA AND LEVODOPA 25; 100 MG/1; MG/1
TABLET ORAL
Qty: 270 TABLET | Refills: 3 | Status: SHIPPED | OUTPATIENT
Start: 2018-12-10 | End: 2019-02-23

## 2018-12-10 NOTE — Clinical Note
12/10/2018         RE: Mervin Caraballo  [address]  [facility] [address]        Dear Colleague,    Thank you for referring your patient, Mervin Caraballo, to the [facility]. Please see a copy of my visit note below.    No notes on file    Again, thank you for allowing me to participate in the care of your patient.        Sincerely,        Reji Arriaza MD

## 2018-12-10 NOTE — LETTER
12/10/2018      RE: Mervin Caraballo  [address]  [facility] [address]       Diagnosis/Summary/Recommendations:    PATIENT: Mervin Caraballo  78 year old male     : 1940    DEMARCO: December  10, 2018    Parkinsonism    Atrial fibrillation - may be a candidate for a device to prevent emboli and allow him to go off coumadin given his parkinson and fall risk.     9/15/2018  Head ct when he had his fall    Impression:   1. No fracture or hemorrhage.  2. Small vessel ischemic disease.  [facility]    Not sure if he is eligible for mt consultation for patt Salgado.    He has interrupted sleep  Goes to sleep around 10pm  His wife sleeps separately.   He wakes during the night 5-6 times per night or at least 4 times per night.  He may have night maier or ?   Hears people knocking on the doors  He has been forgetful    He has sleep apnea and not using cpap.    He has an enlarged prostate and had an evaluation long ago.   He has not seen urology in a while.   He may have been on flomax in the past.        Medications                 Calcium carbonate 1500mg or 600mg             Flecainide tambocor 100mg             Furosemide lasix 20mg Not taking           Krill oil 500mg             Megared omega-3 krill oil 500mg or 750mg ?           Metoprolol succinate toprol-xl 25mg 24 hr tablet              MVI ocuvite 1           Rosuvastatin crestor 10mg             Warfarin coumadin 5mg                                         History obtained from patient     PLAN  Pharmacy consultation with patt Salgado, Pharm D  Consideration for trial of sinemet carbidopa/levodopa   Explained possible side effects of medications and schedule.  Return back in 3months.       May want to review his prostate/bladder issues with his pcp or/and urology consultation      rem behavioral sleep disorder  Consider use of melatonin at night starting with 3 or 5mg before bed time    Ongoing sleep issues related to sleep  apnea, nocturia, and rem behavioral sleep disroder    A fib and anticoagulation issue being discussed.       Coding statement:   Duration of  Services: patient care and care coordination was 25 minutes  Greater than 50% of this visit was spent in counseling and coordination of care.     Reji Arriaza MD     ______________________________________    Last visit date and details:      Parkinsonism - not clear if there is a specific side of onset - right possibly but he has features that appear to be more pronounced on the left.      Discussed tests  1. Dopamine scan (Datscan)  2. Brain mri  3. Neck mri     Will wait on these tests for now     Will proceed with big and loud therapy through [facility] as it close to them as they are living in [facility]     No medication for now.      Will see again in [place]     Discussed medication class, etc.            ______________________________________      Patient was asked about 14 Review of systems including changes in vision (dry eyes, double vision), hearing, heart, lungs, musculoskeletal, depression, anxiety, snoring, RBD, insomnia, urinary frequency, urinary urgency, constipation, swallowing problems, hematological, ID, allergies, skin problems: seborrhea, endocrinological: thyroid, diabetes, cholesterol; balance, weight changes, and other neurological problems and these were not significant at this time except for   Patient Active Problem List   Diagnosis     ACP (advance care planning)     Anemia     Anticoagulant long-term use     Atrial fibrillation, currently in sinus rhythm     Bilateral lower extremity edema     Broken hip (H)     Cataract     Degenerative arthritis of lumbar spine     Encounter for monitoring Coumadin therapy     High blood pressure     HTN (hypertension)     Hyperlipidemia     Knee joint replacement status, left     MAXIME (obstructive sleep apnea)     Osteoporosis     Paroxysmal atrial fibrillation (H)     Prediabetes      Routine general medical examination at a health care facility     Primary osteoarthritis of left knee     S/p left hip fracture          Allergies   Allergen Reactions     Lisinopril Cough     No Clinical Screening - See Comments      PN: LW FI1: food-nka     Past Surgical History:   Procedure Laterality Date     ARTHROSCOPY KNEE Right      ep cardioversion 2012, 2014, 2014 and 2015       ep studya/ablation 2015       EYE SURGERY Left     cataract left     HIP SURGERY Left 2018    fracture surgery     KNEE SURGERY Left     reconstruction     KNEE SURGERY Left 2017    replacement 2017     No past medical history on file.  Social History     Social History     Marital status:      Spouse name: N/A     Number of children: N/A     Years of education: N/A     Occupational History     Not on file.     Social History Main Topics     Smoking status: Former Smoker     Smokeless tobacco: Never Used      Comment: quit 1965     Alcohol use Yes     Drug use: Not on file     Sexual activity: Not on file     Other Topics Concern     Not on file     Social History Narrative    LIVES IN [place].  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  Medications and Vitals not listed above were documented in the cart and reviewed by me.     Current Outpatient Prescriptions   Medication Sig Dispense Refill     calcium carbonate (CALCIUM CARBONATE) 600 MG tablet Take 1,500 mg by mouth       flecainide (TAMBOCOR) 100 MG tablet 100mg tab by mouth twice daily       Krill Oil 500 MG CAPS        metoprolol succinate (TOPROL-XL) 25 MG 24 hr tablet 25mg tab by mouth twice ? daily       rosuvastatin (CRESTOR) 10 MG tablet 1/2 of  10mg by mouth once daily       warfarin (COUMADIN) 5 MG tablet TAKE 1/2 TO 1 TABLET BY MOUTH DAILY OR AS DIRECTED BY INR CLINIC           Reji Arriaza MD

## 2018-12-10 NOTE — TELEPHONE ENCOUNTER
Spoke to patients wife Albina about attending one of the monthly Parkinson's disease classes at the clinic and surgery center. Albina and Mervin would like to think it over and was given the call center number to let us know if they would like to attend or would lie further information about the class.

## 2018-12-10 NOTE — NURSING NOTE
Mervin Caraballo's goals for this visit include:   He requests these members of his care team be copied on today's visit information: PCP    PCP: Jenny Chew    Referring Provider:  No referring provider defined for this encounter.    /84 (BP Location: Left arm, Patient Position: Sitting, Cuff Size: Adult Large)   Pulse 64   Wt 121.2 kg (267 lb 3.2 oz)   SpO2 96%   BMI 34.31 kg/m      Do you need any medication refills at today's visit? N

## 2018-12-10 NOTE — PATIENT INSTRUCTIONS
DEMARCO: December  10, 2018    Parkinsonism    Atrial fibrillation - may be a candidate for a device to prevent emboli and allow him to go off coumadin given his parkinson and fall risk.     9/15/2018  Head ct when he had his fall    Impression:   1. No fracture or hemorrhage.  2. Small vessel ischemic disease.  [facility]    Not sure if he is eligible for mtm consultation for patt Salgado.    He has interrupted sleep  Goes to sleep around 10pm  His wife sleeps separately.   He wakes during the night 5-6 times per night or at least 4 times per night.  He may have night maier or ?   Hears people knocking on the doors  He has been forgetful    He has sleep apnea and not using cpap.    He has an enlarged prostate and had an evaluation long ago.   He has not seen urology in a while.   He may have been on flomax in the past.        Medications                 Calcium carbonate 1500mg or 600mg             Flecainide tambocor 100mg             Furosemide lasix 20mg Not taking           Krill oil 500mg             Megared omega-3 krill oil 500mg or 750mg ?           Metoprolol succinate toprol-xl 25mg 24 hr tablet              MVI ocuvite 1           Rosuvastatin crestor 10mg             Warfarin coumadin 5mg                                         History obtained from patient     PLAN  Pharmacy consultation with patt Salgado, Pharm D  Consideration for trial of sinemet carbidopa/levodopa   Explained possible side effects of medications and schedule.  Return back in 3months.       May want to review his prostate/bladder issues with his pcp or/and urology consultation      rem behavioral sleep disorder  Consider use of melatonin at night starting with 3 or 5mg before bed time    Ongoing sleep issues related to sleep apnea, nocturia, and rem behavioral sleep disroder    A fib and anticoagulation issue being discussed.

## 2018-12-21 ENCOUNTER — ALLIED HEALTH/NURSE VISIT (OUTPATIENT)
Dept: PHARMACY | Facility: CLINIC | Age: 78
End: 2018-12-21
Payer: COMMERCIAL

## 2018-12-21 DIAGNOSIS — H26.9 CATARACT, UNSPECIFIED CATARACT TYPE, UNSPECIFIED LATERALITY: ICD-10-CM

## 2018-12-21 DIAGNOSIS — I48.0 PAROXYSMAL ATRIAL FIBRILLATION (H): ICD-10-CM

## 2018-12-21 DIAGNOSIS — E78.5 HYPERLIPIDEMIA, UNSPECIFIED HYPERLIPIDEMIA TYPE: ICD-10-CM

## 2018-12-21 DIAGNOSIS — M81.0 OSTEOPOROSIS, UNSPECIFIED OSTEOPOROSIS TYPE, UNSPECIFIED PATHOLOGICAL FRACTURE PRESENCE: ICD-10-CM

## 2018-12-21 DIAGNOSIS — I10 ESSENTIAL HYPERTENSION: ICD-10-CM

## 2018-12-21 DIAGNOSIS — G20.A1 PARKINSON'S DISEASE (H): Primary | ICD-10-CM

## 2018-12-21 DIAGNOSIS — N40.0 BENIGN PROSTATIC HYPERPLASIA, UNSPECIFIED WHETHER LOWER URINARY TRACT SYMPTOMS PRESENT: ICD-10-CM

## 2018-12-21 PROCEDURE — 99607 MTMS BY PHARM ADDL 15 MIN: CPT | Performed by: PHARMACIST

## 2018-12-21 PROCEDURE — 99605 MTMS BY PHARM NP 15 MIN: CPT | Performed by: PHARMACIST

## 2018-12-21 RX ORDER — LOSARTAN POTASSIUM 50 MG/1
50 TABLET ORAL 2 TIMES DAILY
COMMUNITY
End: 2019-02-25

## 2018-12-21 RX ORDER — TAMSULOSIN HYDROCHLORIDE 0.4 MG/1
0.4 CAPSULE ORAL DAILY
COMMUNITY
End: 2019-02-25

## 2018-12-21 NOTE — PROGRESS NOTES
SUBJECTIVE/OBJECTIVE:                           Mervin Caraballo is a 78 year old male called for an initial visit for Medication Therapy Management.  He was referred to me from Dr. Arriaza. Wife, Albina, also on the call today.    Chief Complaint: Initial MTM visit    Allergies/ADRs: Reviewed in Epic  Tobacco: History of tobacco dependence - quit 30 years ago  Alcohol: 1-3 beverages / week  Caffeine: one decaf coffee every other day; 1 diet coke/iced tea per day  Activity: He went to [employer] classes; goes to the gym 3 times/week (recumbant bike MWF for 30 minutes then arm/leg exercises)  PMH: Reviewed in Epic    Medication Adherence/Access: no issues reported    Parkinson's Disease:  Current medications include: Carbidopa-levodopa  mg one-half tablet twice daily; he is currently titrating up to 1 tablet three times daily. Patient's primary PD symptoms including neck soreness, slowness, and back pain. Denies side effects of the medication so far. Wife states he eats a lot of iron and protein (meats, potatoes, green vegetables) and she is concerned about interference with the medications. Wife states that his memory has been a little worse lately as well.     Afib/HTN: Patient is currently taking warfarin 5 mg half-tablet every day except Tuesdays takes a full tablet for anticoagulation. Patient reports no current concerns of bruising or bleeding. Patient does not have a hx of GI bleed. Last INR was 2.2 on 11/30/18. Per Cardiology, his CHADS-VASc score is 3. He is going to undergo a Watchman procedure on 1/22/19 and recently had a pre-op visit with cardiology. Patient is also taking flecainide 100 mg twice daily and metoprolol XL 25 mg twice daily. He recently started losartan 50 mg daily due to elevated blood pressure when he saw cardiology. Pt reports the following medication side effects: fatigue/tiredness - this has been going on for years, prior to PD diagnosis.   BP Readings from Last 3 Encounters:    12/10/18 144/84   07/27/18 161/86     BPH: Currently taking tamsulosin 0.4 mg daily. This was recently started at his cardiology appointment because of his enlarged prostate and risk for incontinence during the procedure.  Last PSA on file was WNL in 2006. Patient endorses frequent urination but this is not bothersome to him. He is not sure if he will be continuing tamsulosin after the procedure in January.    Hyperlipidemia: Current therapy includes rosuvastatin 10 mg half-tablet daily and krill oil 500 mg daily.  Pt reports no significant myalgias or other side effects. ASCVD risk is 33%.      Osteoporosis: Current therapy includes: calcium 600 mg/Vitamin D 800 units (Caltrate). Pt is not experiencing side effects.  Pt is getting the following sources of dietary calcium: minimal  Last vitamin D level: 2015 36.3 ng/dL  DEXA History: 2015 T score -2.6 at femoral neck  Risk factors: none    Eye health: Takes Ocuvite daily (recommended by eye doctor - he had cataract surgery this year).     ASSESSMENT:                             Current medications were reviewed today.     Medication Adherence: excellent, no issues identified    Parkinson's Disease:  Improving. Too soon to assess efficacy of carbidopa-levodopa. Briefly discussed that there are memory medications but would not add something new at this time. Also discussed protein interaction with carbidopa-levodopa.    Afib/HTN: Improving. Too soon to assess benefit of addition of losartan. May be going off warfarin after Watchman procedure.    BPH: Improving. Encouraged patient to monitor effects of tamsulosin and if beneficial for urinary symptoms may want to continue after procedure.    Hyperlipidemia: Stable. Moderate-intensity statin appears reasonable for this patient.      Osteoporosis: Needs improvement. Patient may benefit from higher dose of calcium given his osteoporosis history. He is also due for another DEXA scan, which is already planning to  schedule.     Eye health: Stable.     PLAN:                            1. Increase calcium to twice a day. Follow up with PCP for another DEXA scan.  2. Monitor effects of tamsulosin. May need to recheck PSA.  3. Take carbidopa-levodopa one hour before meals or two hours after meals.     I spent 35 minutes with this patient today. All changes were made via collaborative practice agreement with Dr. Arriaza. A copy of the visit note was provided to the patient's referring provider.    Will follow up in one year or sooner if needed.    The patient was mailed a summary of these recommendations as an after visit summary.     Amy Salgado, Pharm.D.  Medication Therapy Management Pharmacist  Phone: [phone]

## 2018-12-21 NOTE — PATIENT INSTRUCTIONS
Recommendations from today's MTM visit:                                                    MTM (medication therapy management) is a service provided by a clinical pharmacist designed to help you get the most of out of your medicines.     1. Increase calcium to twice a day. Follow up with primary care doctor for another DEXA scan.    2. Monitor effects of tamsulosin (Flomax) on urination. May need to recheck PSA to look at your prostate size and determine if you should stay on this medication after the Watchman procedure.    3. Take carbidopa-levodopa (Sinemet) one hour before meals or two hours after meals. It is okay to have protein at mealtimes, but some people choose to reduce overall protein intake when they are on carbidopa-levodopa.     Next MTM visit: one year or sooner if needed    To schedule another MTM appointment, please call the clinic directly or you may call the MTM scheduling line at [phone] or toll-free at [phone].     My Clinical Pharmacist's contact information:                                                      It was a pleasure talking with you today!  Please feel free to contact me with any questions or concerns you have.      Amy Salgado, Pharm.D.  Medication Therapy Management Pharmacist  Phone: [phone]    You may receive a survey about the MTM services you received.  I would appreciate your feedback to help me serve you better in the future. Please fill it out and return it when you can. Your comments will be anonymous.

## 2019-01-08 ENCOUNTER — TELEPHONE (OUTPATIENT)
Dept: PHARMACY | Facility: CLINIC | Age: 79
End: 2019-01-08

## 2019-01-08 NOTE — TELEPHONE ENCOUNTER
Spoke with patient's wife (patient was present in the room) and she states that patient has been having dizzy episodes lately, which typically occur upon changing from sitting to standing. Patient has recently started losartan and also has increased carbidopa-levodopa to 3 pills/day recently. They are not monitoring blood pressure at home at all.    MTM Recommendations:   1. Purchase home blood pressure monitor. Keep track of BP's, especially when feeling dizzy.   2. Encouraged hydration (wife has been giving him a lot of water lately).  3. Recommend follow up with cardiology if BP's are low and patient remains dizzy; may need to reduce losartan dose (wife already has a call into cardiology).     Amy Salgado, Pharm.D.  Medication Therapy Management Pharmacist  Phone: [phone]

## 2019-01-08 NOTE — TELEPHONE ENCOUNTER
Patient's wife, Albina, called and is requesting a call back to discuss a question she has. Her phone number is [phone].    Amy Salgado, Pharm.D.  Medication Therapy Management Pharmacist  Phone: [phone]

## 2019-02-21 PROBLEM — Z79.01 ANTICOAGULANT LONG-TERM USE: Status: ACTIVE | Noted: 2017-05-02

## 2019-02-23 RX ORDER — CARBIDOPA AND LEVODOPA 25; 100 MG/1; MG/1
TABLET ORAL
Qty: 270 TABLET | Refills: 3 | COMMUNITY
Start: 2019-02-23 | End: 2019-02-25

## 2019-02-23 NOTE — PROGRESS NOTES
Diagnosis/Summary/Recommendations:    PATIENT: Mervin Caraballo  78 year old male     : 1940    DEMARCO: 2019    Parkinson    Atrial fibrillation, status post ablation on August 15, but maintained on chronic Coumadin.  -s/p Watchman Left Atrial Appendage occlusion device implant 2019    Will get an echo in march and hopefully can get off warfarin.    May be going to start clopidogrel 75 mg daily if device is okay and will go off warfarin.   Long term he may go off clopidogrel and only be on aspirin.     He has not really had great benefit from the sinemet and has not had any specific side effects.         Medications     7am  8am  11am    530pm 9pm    Aspirin 81mg  1       Calcium carbonate 1500mg or 600mg   1          Carbidopa/levodopa Sinemet 25/100  1  1  1    Flecainide tambocor 100mg  1       1    Krill oil 500mg          1    Losartan cozaar 50mg  1   1    Metoprolol succinate toprol-xl 25mg 24 hr tablet    1       1   MVI ocuvite  1           Rosuvastatin crestor 10mg          1/2    Tamsulosin flomax 0.4mg  stopped       Warfarin coumadin 5mg           dose                                    History obtained from patient    He is continuing to do his exercises at home. He is going to the health club 3 days per week  Had started on the recumbent bike for 30 minutes and does leg curls and extensions, etc.     PLAN  Cardiology issues are being monitored and addressed - see above.     Will continue to have monitoring of his INR and his hemoglobin which was 11.4    He also has had some elevation in his blood glucose when checked in January it was 103.    No change in parkinson regimen    Return back.             Coding statement:   Duration of  Services: patient care and care coordination was 25 minutes  Greater than 50% of this visit was spent in counseling and coordination of care.     Reji Arriaza MD     ______________________________________    Last visit date and details:      DEMARCO:  December  10, 2018     Parkinsonism     Atrial fibrillation - may be a candidate for a device to prevent emboli and allow him to go off coumadin given his parkinson and fall risk.      9/15/2018  Head ct when he had his fall     Impression:   1. No fracture or hemorrhage.  2. Small vessel ischemic disease.  [facility]     Not sure if he is eligible for mtm consultation for patt Salgado.     He has interrupted sleep  Goes to sleep around 10pm  His wife sleeps separately.   He wakes during the night 5-6 times per night or at least 4 times per night.  He may have night maier or ?   Hears people knocking on the doors  He has been forgetful     He has sleep apnea and not using cpap.     He has an enlarged prostate and had an evaluation long ago.   He has not seen urology in a while.   He may have been on flomax in the past.         Medications                 Calcium carbonate 1500mg or 600mg             Flecainide tambocor 100mg             Furosemide lasix 20mg Not taking           Krill oil 500mg             Megared omega-3 krill oil 500mg or 750mg ?           Metoprolol succinate toprol-xl 25mg 24 hr tablet              MVI ocuvite 1           Rosuvastatin crestor 10mg             Warfarin coumadin 5mg                                            History obtained from patient      PLAN  Pharmacy consultation with patt Salgado, Pharm D  Consideration for trial of sinemet carbidopa/levodopa   Explained possible side effects of medications and schedule.  Return back in 3months.         May want to review his prostate/bladder issues with his pcp or/and urology consultation        rem behavioral sleep disorder  Consider use of melatonin at night starting with 3 or 5mg before bed time     Ongoing sleep issues related to sleep apnea, nocturia, and rem behavioral sleep disroder     A fib and anticoagulation issue being discussed.        Mervin Caraballo is a 78 year old male called for an initial  visit for Medication Therapy Management.  He was referred to me from Dr. Arriaza. Wife, Albina, also on the call today.     Chief Complaint: Initial MTM visit     Allergies/ADRs: Reviewed in Epic  Tobacco: History of tobacco dependence - quit 30 years ago  Alcohol: 1-3 beverages / week  Caffeine: one decaf coffee every other day; 1 diet coke/iced tea per day  Activity: He went to [employer] classes; goes to the gym 3 times/week (recumbant bike MWF for 30 minutes then arm/leg exercises)  PMH: Reviewed in Epic     Medication Adherence/Access: no issues reported     Parkinson's Disease:  Current medications include: Carbidopa-levodopa  mg one-half tablet twice daily; he is currently titrating up to 1 tablet three times daily. Patient's primary PD symptoms including neck soreness, slowness, and back pain. Denies side effects of the medication so far. Wife states he eats a lot of iron and protein (meats, potatoes, green vegetables) and she is concerned about interference with the medications. Wife states that his memory has been a little worse lately as well.      Afib/HTN: Patient is currently taking warfarin 5 mg half-tablet every day except Tuesdays takes a full tablet for anticoagulation. Patient reports no current concerns of bruising or bleeding. Patient does not have a hx of GI bleed. Last INR was 2.2 on 11/30/18. Per Cardiology, his CHADS-VASc score is 3. He is going to undergo a Watchman procedure on 1/22/19 and recently had a pre-op visit with cardiology. Patient is also taking flecainide 100 mg twice daily and metoprolol XL 25 mg twice daily. He recently started losartan 50 mg daily due to elevated blood pressure when he saw cardiology. Pt reports the following medication side effects: fatigue/tiredness - this has been going on for years, prior to PD diagnosis.   BP Readings from Last 3 Encounters:   12/10/18 144/84   07/27/18 161/86      BPH: Currently taking tamsulosin 0.4 mg daily. This was recently  started at his cardiology appointment because of his enlarged prostate and risk for incontinence during the procedure.  Last PSA on file was WNL in 2006. Patient endorses frequent urination but this is not bothersome to him. He is not sure if he will be continuing tamsulosin after the procedure in January.     Hyperlipidemia: Current therapy includes rosuvastatin 10 mg half-tablet daily and krill oil 500 mg daily.  Pt reports no significant myalgias or other side effects. ASCVD risk is 33%.       Osteoporosis: Current therapy includes: calcium 600 mg/Vitamin D 800 units (Caltrate). Pt is not experiencing side effects.  Pt is getting the following sources of dietary calcium: minimal  Last vitamin D level: 2015 36.3 ng/dL  DEXA History: 2015 T score -2.6 at femoral neck  Risk factors: none     Eye health: Takes Ocuvite daily (recommended by eye doctor - he had cataract surgery this year).      ASSESSMENT:                             Current medications were reviewed today.      Medication Adherence: excellent, no issues identified     Parkinson's Disease:  Improving. Too soon to assess efficacy of carbidopa-levodopa. Briefly discussed that there are memory medications but would not add something new at this time. Also discussed protein interaction with carbidopa-levodopa.     Afib/HTN: Improving. Too soon to assess benefit of addition of losartan. May be going off warfarin after Watchman procedure.     BPH: Improving. Encouraged patient to monitor effects of tamsulosin and if beneficial for urinary symptoms may want to continue after procedure.     Hyperlipidemia: Stable. Moderate-intensity statin appears reasonable for this patient.      Osteoporosis: Needs improvement. Patient may benefit from higher dose of calcium given his osteoporosis history. He is also due for another DEXA scan, which is already planning to schedule.      Eye health: Stable.      PLAN:                             1. Increase calcium to twice a  day. Follow up with PCP for another DEXA scan.  2. Monitor effects of tamsulosin. May need to recheck PSA.  3. Take carbidopa-levodopa one hour before meals or two hours after meals.      I spent 35 minutes with this patient today. All changes were made via collaborative practice agreement with Dr. Arriaza. A copy of the visit note was provided to the patient's referring provider.     Will follow up in one year or sooner if needed.     The patient was mailed a summary of these recommendations as an after visit summary.      Amy Salgado, Pharm.D.  Medication Therapy Management Pharmacist  Phone: [phone]    Spoke with patient's wife (patient was present in the room) and she states that patient has been having dizzy episodes lately, which typically occur upon changing from sitting to standing. Patient has recently started losartan and also has increased carbidopa-levodopa to 3 pills/day recently. They are not monitoring blood pressure at home at all.     MTM Recommendations:   1. Purchase home blood pressure monitor. Keep track of BP's, especially when feeling dizzy.   2. Encouraged hydration (wife has been giving him a lot of water lately).  3. Recommend follow up with cardiology if BP's are low and patient remains dizzy; may need to reduce losartan dose (wife already has a call into cardiology).      Amy Salgado Pharm.D.  Medication Therapy Management Pharmacist  Phone: [phone]    HOSPITAL DISCHARGE SUMMARY    Patient Name: Mervin Caraballo  YOB: 1940 Age: 78 y.o.  Medical Record Number: [id number]  Primary Physician: MADDIE Paige  Phone: [phone]  Primary Cardiologist: Dr. Lehman    Admission Date: 1/22/2019  Discharge Date: 1/23/2019     Mervin Caraballo will be [facility] to home    DISCHARGE DIAGNOSIS:  1. Atrial fibrillation, status post ablation on August 15, but maintained on chronic Coumadin.  -s/p Watchman Left Atrial Appendage occlusion device implant  1/22/2019  2. PVCs. Shown 21% on Holter monitor.   3. Normal echo.   4. Hypertension.   5. Mixed obesity.   6. Untreated sleep apnea.   7. Chronic anemia.   8. Parkinson's with gait instability.    BRIEF HOSPITAL COURSE: 78 -year-old gentleman who was recently diagnosed with Parkinson's gait instability. He has fallen and broken his hip. He has atrial fibrillation. He has been on chronic Coumadin, but he continues to fall. He is minimally symptomatic from his atrial fibrillation. CHADS VASc score of 3 for age, hypertension, heart failure with appropriate reason to come off of long-term anticoagulation because of frequent falls with a recent diagnosis of Parkinson's. He has had falls with injury resulting in hip fracture, so he could be a candidate for short-term anticoagulation. Watchman left atrial appendage occlusion device implantation was recommended to reduce the risk of stroke. He was brought electively and underwent successful Watchman implant without difficulty. See procedure note for full details.     This morning Mr. Caraballo patient feels well. He feels ready to go home. He is sitting up in the chair and has walked in the halls. Telemetry shows SR in the 60s. Bilateral groin sutures removed without difficulty. Groin sites are intact bilaterally without hematoma or bruit. Labs are stable, Hgb 11.4. Vital signs are stable. He will remain on aspirin 81 mg daily in addition to warfarin (goal INR 2.0-2.5) for approximately 45 days. He will be discharged to home in stable and satisfactory condition with follow up in atrial fibrillation clinic in 2 week as listed below. INR will be checked per PCP on 1/25/19. KYM is scheduled for 6 weeks with hopefully Watchmen well seated and able to stop warfarin.    PROCEDURES PERFORMED DURING HOSPITALIZATION:  On 1/22/19 with Dr. Mercado  1. Left atrial appendage occlusion device implant  2. Transeptal puncture  3. Intracardiac echo   4. Left heart catheterization  5.  Transesophageal echo  6. Ultrasound-guided venous access  7. Suture closure of femoral venous access            ______________________________________      Patient was asked about 14 Review of systems including changes in vision (dry eyes, double vision), hearing, heart, lungs, musculoskeletal, depression, anxiety, snoring, RBD, insomnia, urinary frequency, urinary urgency, constipation, swallowing problems, hematological, ID, allergies, skin problems: seborrhea, endocrinological: thyroid, diabetes, cholesterol; balance, weight changes, and other neurological problems and these were not significant at this time except for   Patient Active Problem List   Diagnosis     ACP (advance care planning)     Anemia     Anticoagulant long-term use     Atrial fibrillation, currently in sinus rhythm     Bilateral lower extremity edema     Broken hip (H)     Cataract     Degenerative arthritis of lumbar spine     Encounter for monitoring Coumadin therapy     High blood pressure     HTN (hypertension)     Hyperlipidemia     Knee joint replacement status, left     MAXIME (obstructive sleep apnea)     Osteoporosis     Paroxysmal atrial fibrillation (H)     Prediabetes     Routine general medical examination at a health care facility     Primary osteoarthritis of left knee     S/p left hip fracture     Parkinson's disease (H)          Allergies   Allergen Reactions     Lisinopril Cough     No Clinical Screening - See Comments      PN: LW FI1: food-nka     Past Surgical History:   Procedure Laterality Date     ARTHROSCOPY KNEE Right      ep cardioversion 2012, 2014, 2014 and 2015       ep studya/ablation 2015       EYE SURGERY Left     cataract left     HIP SURGERY Left 2018    fracture surgery     KNEE SURGERY Left     reconstruction     KNEE SURGERY Left 2017    replacement 2017     No past medical history on file.  Social History     Socioeconomic History     Marital status:      Spouse name: Not on file     Number of children:  Not on file     Years of education: Not on file     Highest education level: Not on file   Occupational History     Not on file   Social Needs     Financial resource strain: Not on file     Food insecurity:     Worry: Not on file     Inability: Not on file     Transportation needs:     Medical: Not on file     Non-medical: Not on file   Tobacco Use     Smoking status: Former Smoker     Smokeless tobacco: Never Used     Tobacco comment: quit 1965   Substance and Sexual Activity     Alcohol use: Yes     Drug use: Not on file     Sexual activity: Not on file   Lifestyle     Physical activity:     Days per week: Not on file     Minutes per session: Not on file     Stress: Not on file   Relationships     Social connections:     Talks on phone: Not on file     Gets together: Not on file     Attends Bahai service: Not on file     Active member of club or organization: Not on file     Attends meetings of clubs or organizations: Not on file     Relationship status: Not on file     Intimate partner violence:     Fear of current or ex partner: Not on file     Emotionally abused: Not on file     Physically abused: Not on file     Forced sexual activity: Not on file   Other Topics Concern     Not on file   Social History Narrative    LIVES IN [place].  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  Medications and Vitals not listed above were documented in the cart and reviewed by me.     Current Outpatient Medications   Medication Sig Dispense Refill     calcium carbonate 600 mg-vitamin D 400 units (CALTRATE) 600-400 MG-UNIT per tablet Take 1 tablet by mouth 2 times daily       carbidopa-levodopa (SINEMET)  MG  tablet 1/2 tab by mouth daily x 3 days; then 1/2 tab twice daily x 3-7 days then 1 tab twice daily X 7days then 1 tab 3/day 270 tablet 3     flecainide (TAMBOCOR) 100 MG tablet 100mg tab by mouth twice daily       Krill Oil 500 MG CAPS Take 1 capsule by mouth daily        losartan (COZAAR) 50 MG tablet Take 50 mg by mouth daily       metoprolol succinate (TOPROL-XL) 25 MG 24 hr tablet Take 25 mg by mouth 2 times daily        Multiple Vitamins-Minerals (OCUVITE PO) Take 1 tablet by mouth daily        rosuvastatin (CRESTOR) 10 MG tablet 1/2 of 10mg by mouth once daily       tamsulosin (FLOMAX) 0.4 MG capsule Take 0.4 mg by mouth daily       warfarin (COUMADIN) 5 MG tablet TAKE 1/2 TO 1 TABLET BY MOUTH DAILY OR AS DIRECTED BY INR CLINIC           Reji Arriaza MD

## 2019-02-25 ENCOUNTER — OFFICE VISIT (OUTPATIENT)
Dept: NEUROLOGY | Facility: CLINIC | Age: 79
End: 2019-02-25
Payer: MEDICARE

## 2019-02-25 VITALS
HEART RATE: 61 BPM | OXYGEN SATURATION: 98 % | DIASTOLIC BLOOD PRESSURE: 70 MMHG | SYSTOLIC BLOOD PRESSURE: 116 MMHG | WEIGHT: 263.6 LBS | BODY MASS INDEX: 33.84 KG/M2

## 2019-02-25 DIAGNOSIS — G20.C PARKINSONISM, UNSPECIFIED PARKINSONISM TYPE (H): Primary | ICD-10-CM

## 2019-02-25 PROBLEM — I48.91 A-FIB (H): Status: ACTIVE | Noted: 2019-01-22

## 2019-02-25 PROBLEM — I48.0 PAROXYSMAL A-FIB (H): Status: ACTIVE | Noted: 2019-02-25

## 2019-02-25 PROCEDURE — 99214 OFFICE O/P EST MOD 30 MIN: CPT | Performed by: PSYCHIATRY & NEUROLOGY

## 2019-02-25 RX ORDER — ASPIRIN 81 MG/1
TABLET ORAL
COMMUNITY
Start: 2019-02-25

## 2019-02-25 RX ORDER — FLECAINIDE ACETATE 100 MG/1
TABLET ORAL
COMMUNITY
Start: 2019-02-25 | End: 2020-07-27

## 2019-02-25 RX ORDER — TAMSULOSIN HYDROCHLORIDE 0.4 MG/1
CAPSULE ORAL
Refills: 3 | COMMUNITY
Start: 2018-12-17 | End: 2019-02-25

## 2019-02-25 RX ORDER — ASPIRIN 81 MG/1
81 TABLET ORAL DAILY
COMMUNITY
End: 2019-02-25

## 2019-02-25 RX ORDER — METOPROLOL SUCCINATE 25 MG/1
TABLET, EXTENDED RELEASE ORAL
COMMUNITY
Start: 2019-02-25 | End: 2020-07-27

## 2019-02-25 RX ORDER — VITS A,C,E/LUTEIN/MINERALS 300MCG-200
TABLET ORAL
Qty: 90 TABLET | Refills: 3 | COMMUNITY
Start: 2019-02-25

## 2019-02-25 RX ORDER — CARBIDOPA AND LEVODOPA 25; 100 MG/1; MG/1
TABLET ORAL
Qty: 270 TABLET | Refills: 3 | COMMUNITY
Start: 2019-02-25 | End: 2020-12-07

## 2019-02-25 RX ORDER — ROSUVASTATIN CALCIUM 10 MG/1
TABLET, COATED ORAL
COMMUNITY
Start: 2019-02-25 | End: 2020-01-13

## 2019-02-25 RX ORDER — KRILL/OM-3/DHA/EPA/PHOSPHO/AST 500MG-86MG
CAPSULE ORAL
COMMUNITY
Start: 2019-02-25 | End: 2020-01-22

## 2019-02-25 RX ORDER — LOSARTAN POTASSIUM 50 MG/1
TABLET ORAL
Qty: 180 TABLET | Refills: 3 | COMMUNITY
Start: 2019-02-25 | End: 2021-03-22

## 2019-02-25 ASSESSMENT — PAIN SCALES - GENERAL: PAINLEVEL: NO PAIN (0)

## 2019-02-25 NOTE — NURSING NOTE
Mervin Caraballo's goals for this visit include: return  He requests these members of his care team be copied on today's visit information:     PCP: Jenny Chew    Referring Provider:  No referring provider defined for this encounter.    /70   Pulse 61   Wt 119.6 kg (263 lb 9.6 oz)   SpO2 98%   BMI 33.84 kg/m      Do you need any medication refills at today's visit? y

## 2019-02-25 NOTE — LETTER
2019      RE: Mervin Caraballo  [address]  [facility] [address]       Diagnosis/Summary/Recommendations:    PATIENT: Mervin Caraballo  78 year old male     : 1940    DEMARCO: 2019    Parkinson    Atrial fibrillation, status post ablation on August 15, but maintained on chronic Coumadin.  -s/p Watchman Left Atrial Appendage occlusion device implant 2019    Will get an echo in march and hopefully can get off warfarin.    May be going to start clopidogrel 75 mg daily if device is okay and will go off warfarin.   Long term he may go off clopidogrel and only be on aspirin.     He has not really had great benefit from the sinemet and has not had any specific side effects.         Medications     7am  8am  11am    530pm 9pm    Aspirin 81mg  1       Calcium carbonate 1500mg or 600mg   1          Carbidopa/levodopa Sinemet 25/100  1  1  1    Flecainide tambocor 100mg  1       1    Krill oil 500mg          1    Losartan cozaar 50mg  1   1    Metoprolol succinate toprol-xl 25mg 24 hr tablet    1       1   MVI ocuvite  1           Rosuvastatin crestor 10mg          1/2    Tamsulosin flomax 0.4mg  stopped       Warfarin coumadin 5mg           dose                                    History obtained from patient    He is continuing to do his exercises at home. He is going to the health club 3 days per week  Had started on the recumbent bike for 30 minutes and does leg curls and extensions, etc.     PLAN  Cardiology issues are being monitored and addressed - see above.     Will continue to have monitoring of his INR and his hemoglobin which was 11.4    He also has had some elevation in his blood glucose when checked in January it was 103.    No change in parkinson regimen    Return back.             Coding statement:   Duration of  Services: patient care and care coordination was 25 minutes  Greater than 50% of this visit was spent in counseling and coordination of care.     Reji Arriaza MD      ______________________________________    Last visit date and details:      DEMARCO: December  10, 2018     Parkinsonism     Atrial fibrillation - may be a candidate for a device to prevent emboli and allow him to go off coumadin given his parkinson and fall risk.      9/15/2018  Head ct when he had his fall     Impression:   1. No fracture or hemorrhage.  2. Small vessel ischemic disease.  [facility]     Not sure if he is eligible for mtm consultation for patt Salgado.     He has interrupted sleep  Goes to sleep around 10pm  His wife sleeps separately.   He wakes during the night 5-6 times per night or at least 4 times per night.  He may have night maier or ?   Hears people knocking on the doors  He has been forgetful     He has sleep apnea and not using cpap.     He has an enlarged prostate and had an evaluation long ago.   He has not seen urology in a while.   He may have been on flomax in the past.         Medications                 Calcium carbonate 1500mg or 600mg             Flecainide tambocor 100mg             Furosemide lasix 20mg Not taking           Krill oil 500mg             Megared omega-3 krill oil 500mg or 750mg ?           Metoprolol succinate toprol-xl 25mg 24 hr tablet              MVI ocuvite 1           Rosuvastatin crestor 10mg             Warfarin coumadin 5mg                                            History obtained from patient      PLAN  Pharmacy consultation with patt Salgado, Pharm D  Consideration for trial of sinemet carbidopa/levodopa   Explained possible side effects of medications and schedule.  Return back in 3months.         May want to review his prostate/bladder issues with his pcp or/and urology consultation        rem behavioral sleep disorder  Consider use of melatonin at night starting with 3 or 5mg before bed time     Ongoing sleep issues related to sleep apnea, nocturia, and rem behavioral sleep disroder     A fib and anticoagulation issue being  discussed.        Mervin Caraballo is a 78 year old male called for an initial visit for Medication Therapy Management.  He was referred to me from Dr. Arriaza. Wife, Albina, also on the call today.     Chief Complaint: Initial MTM visit     Allergies/ADRs: Reviewed in Epic  Tobacco: History of tobacco dependence - quit 30 years ago  Alcohol: 1-3 beverages / week  Caffeine: one decaf coffee every other day; 1 diet coke/iced tea per day  Activity: He went to [employer] classes; goes to the gym 3 times/week (recumbant bike MWF for 30 minutes then arm/leg exercises)  PMH: Reviewed in Epic     Medication Adherence/Access: no issues reported     Parkinson's Disease:  Current medications include: Carbidopa-levodopa  mg one-half tablet twice daily; he is currently titrating up to 1 tablet three times daily. Patient's primary PD symptoms including neck soreness, slowness, and back pain. Denies side effects of the medication so far. Wife states he eats a lot of iron and protein (meats, potatoes, green vegetables) and she is concerned about interference with the medications. Wife states that his memory has been a little worse lately as well.      Afib/HTN: Patient is currently taking warfarin 5 mg half-tablet every day except Tuesdays takes a full tablet for anticoagulation. Patient reports no current concerns of bruising or bleeding. Patient does not have a hx of GI bleed. Last INR was 2.2 on 11/30/18. Per Cardiology, his CHADS-VASc score is 3. He is going to undergo a Watchman procedure on 1/22/19 and recently had a pre-op visit with cardiology. Patient is also taking flecainide 100 mg twice daily and metoprolol XL 25 mg twice daily. He recently started losartan 50 mg daily due to elevated blood pressure when he saw cardiology. Pt reports the following medication side effects: fatigue/tiredness - this has been going on for years, prior to PD diagnosis.       BP Readings from Last 3 Encounters:   12/10/18 144/84    07/27/18 161/86      BPH: Currently taking tamsulosin 0.4 mg daily. This was recently started at his cardiology appointment because of his enlarged prostate and risk for incontinence during the procedure.  Last PSA on file was WNL in 2006. Patient endorses frequent urination but this is not bothersome to him. He is not sure if he will be continuing tamsulosin after the procedure in January.     Hyperlipidemia: Current therapy includes rosuvastatin 10 mg half-tablet daily and krill oil 500 mg daily.  Pt reports no significant myalgias or other side effects. ASCVD risk is 33%.       Osteoporosis: Current therapy includes: calcium 600 mg/Vitamin D 800 units (Caltrate). Pt is not experiencing side effects.  Pt is getting the following sources of dietary calcium: minimal  Last vitamin D level: 2015 36.3 ng/dL  DEXA History: 2015 T score -2.6 at femoral neck  Risk factors: none     Eye health: Takes Ocuvite daily (recommended by eye doctor - he had cataract surgery this year).      ASSESSMENT:                             Current medications were reviewed today.      Medication Adherence: excellent, no issues identified     Parkinson's Disease:  Improving. Too soon to assess efficacy of carbidopa-levodopa. Briefly discussed that there are memory medications but would not add something new at this time. Also discussed protein interaction with carbidopa-levodopa.     Afib/HTN: Improving. Too soon to assess benefit of addition of losartan. May be going off warfarin after Watchman procedure.     BPH: Improving. Encouraged patient to monitor effects of tamsulosin and if beneficial for urinary symptoms may want to continue after procedure.     Hyperlipidemia: Stable. Moderate-intensity statin appears reasonable for this patient.      Osteoporosis: Needs improvement. Patient may benefit from higher dose of calcium given his osteoporosis history. He is also due for another DEXA scan, which is already planning to schedule.       Eye health: Stable.      PLAN:                             1. Increase calcium to twice a day. Follow up with PCP for another DEXA scan.  2. Monitor effects of tamsulosin. May need to recheck PSA.  3. Take carbidopa-levodopa one hour before meals or two hours after meals.      I spent 35 minutes with this patient today. All changes were made via collaborative practice agreement with Dr. Arriaza. A copy of the visit note was provided to the patient's referring provider.     Will follow up in one year or sooner if needed.     The patient was mailed a summary of these recommendations as an after visit summary.      Amy Salgado, Pharm.D.  Medication Therapy Management Pharmacist  Phone: [phone]    Spoke with patient's wife (patient was present in the room) and she states that patient has been having dizzy episodes lately, which typically occur upon changing from sitting to standing. Patient has recently started losartan and also has increased carbidopa-levodopa to 3 pills/day recently. They are not monitoring blood pressure at home at all.     MTM Recommendations:   1. Purchase home blood pressure monitor. Keep track of BP's, especially when feeling dizzy.   2. Encouraged hydration (wife has been giving him a lot of water lately).  3. Recommend follow up with cardiology if BP's are low and patient remains dizzy; may need to reduce losartan dose (wife already has a call into cardiology).      Mirela BeattyD.  Medication Therapy Management Pharmacist  Phone: [phone]    HOSPITAL DISCHARGE SUMMARY    Patient Name: Mervin Caraballo  YOB: 1940 Age: 78 y.o.  Medical Record Number: [id number]  Primary Physician: MADDIE Paige  Phone: [phone]  Primary Cardiologist: Dr. Lehman    Admission Date: 1/22/2019  Discharge Date: 1/23/2019     Mervin Caraballo will be discharged from [facility] to home    DISCHARGE DIAGNOSIS:  1. Atrial fibrillation, status post ablation on August 15,  but maintained on chronic Coumadin.  -s/p Watchman Left Atrial Appendage occlusion device implant 1/22/2019  2. PVCs. Shown 21% on Holter monitor.   3. Normal echo.   4. Hypertension.   5. Mixed obesity.   6. Untreated sleep apnea.   7. Chronic anemia.   8. Parkinson's with gait instability.    BRIEF HOSPITAL COURSE: 78 -year-old gentleman who was recently diagnosed with Parkinson's gait instability. He has fallen and broken his hip. He has atrial fibrillation. He has been on chronic Coumadin, but he continues to fall. He is minimally symptomatic from his atrial fibrillation. CHADS VASc score of 3 for age, hypertension, heart failure with appropriate reason to come off of long-term anticoagulation because of frequent falls with a recent diagnosis of Parkinson's. He has had falls with injury resulting in hip fracture, so he could be a candidate for short-term anticoagulation. Watchman left atrial appendage occlusion device implantation was recommended to reduce the risk of stroke. He was brought electively and underwent successful Watchman implant without difficulty. See procedure note for full details.     This morning Mr. Caraballo patient feels well. He feels ready to go home. He is sitting up in the chair and has walked in the halls. Telemetry shows SR in the 60s. Bilateral groin sutures removed without difficulty. Groin sites are intact bilaterally without hematoma or bruit. Labs are stable, Hgb 11.4. Vital signs are stable. He will remain on aspirin 81 mg daily in addition to warfarin (goal INR 2.0-2.5) for approximately 45 days. He will be discharged to home in stable and satisfactory condition with follow up in atrial fibrillation clinic in 2 week as listed below. INR will be checked per PCP on 1/25/19. KYM is scheduled for 6 weeks with hopefully Watchmen well seated and able to stop warfarin.    PROCEDURES PERFORMED DURING HOSPITALIZATION:  On 1/22/19 with Dr. Mercado  1. Left atrial appendage occlusion  device implant  2. Transeptal puncture  3. Intracardiac echo   4. Left heart catheterization  5. Transesophageal echo  6. Ultrasound-guided venous access  7. Suture closure of femoral venous access            ______________________________________      Patient was asked about 14 Review of systems including changes in vision (dry eyes, double vision), hearing, heart, lungs, musculoskeletal, depression, anxiety, snoring, RBD, insomnia, urinary frequency, urinary urgency, constipation, swallowing problems, hematological, ID, allergies, skin problems: seborrhea, endocrinological: thyroid, diabetes, cholesterol; balance, weight changes, and other neurological problems and these were not significant at this time except for   Patient Active Problem List   Diagnosis     ACP (advance care planning)     Anemia     Anticoagulant long-term use     Atrial fibrillation, currently in sinus rhythm     Bilateral lower extremity edema     Broken hip (H)     Cataract     Degenerative arthritis of lumbar spine     Encounter for monitoring Coumadin therapy     High blood pressure     HTN (hypertension)     Hyperlipidemia     Knee joint replacement status, left     MAXIME (obstructive sleep apnea)     Osteoporosis     Paroxysmal atrial fibrillation (H)     Prediabetes     Routine general medical examination at a health care facility     Primary osteoarthritis of left knee     S/p left hip fracture     Parkinson's disease (H)          Allergies   Allergen Reactions     Lisinopril Cough     No Clinical Screening - See Comments      PN: LW FI1: food-nka     Past Surgical History:   Procedure Laterality Date     ARTHROSCOPY KNEE Right      ep cardioversion 2012, 2014, 2014 and 2015       ep studya/ablation 2015       EYE SURGERY Left     cataract left     HIP SURGERY Left 2018    fracture surgery     KNEE SURGERY Left     reconstruction     KNEE SURGERY Left 2017    replacement 2017     No past medical history on file.  Social History      Socioeconomic History     Marital status:      Spouse name: Not on file     Number of children: Not on file     Years of education: Not on file     Highest education level: Not on file   Occupational History     Not on file   Social Needs     Financial resource strain: Not on file     Food insecurity:     Worry: Not on file     Inability: Not on file     Transportation needs:     Medical: Not on file     Non-medical: Not on file   Tobacco Use     Smoking status: Former Smoker     Smokeless tobacco: Never Used     Tobacco comment: quit 1965   Substance and Sexual Activity     Alcohol use: Yes     Drug use: Not on file     Sexual activity: Not on file   Lifestyle     Physical activity:     Days per week: Not on file     Minutes per session: Not on file     Stress: Not on file   Relationships     Social connections:     Talks on phone: Not on file     Gets together: Not on file     Attends Voodoo service: Not on file     Active member of club or organization: Not on file     Attends meetings of clubs or organizations: Not on file     Relationship status: Not on file     Intimate partner violence:     Fear of current or ex partner: Not on file     Emotionally abused: Not on file     Physically abused: Not on file     Forced sexual activity: Not on file   Other Topics Concern     Not on file   Social History Narrative    LIVES IN [place].  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  Medications and Vitals not listed above were documented in the cart and reviewed by me.     Current Outpatient Medications   Medication Sig Dispense Refill     calcium carbonate 600 mg-vitamin D 400 units (CALTRATE)  600-400 MG-UNIT per tablet Take 1 tablet by mouth 2 times daily       carbidopa-levodopa (SINEMET)  MG tablet 1/2 tab by mouth daily x 3 days; then 1/2 tab twice daily x 3-7 days then 1 tab twice daily X 7days then 1 tab 3/day 270 tablet 3     flecainide (TAMBOCOR) 100 MG tablet 100mg tab by mouth twice daily       Krill Oil 500 MG CAPS Take 1 capsule by mouth daily        losartan (COZAAR) 50 MG tablet Take 50 mg by mouth daily       metoprolol succinate (TOPROL-XL) 25 MG 24 hr tablet Take 25 mg by mouth 2 times daily        Multiple Vitamins-Minerals (OCUVITE PO) Take 1 tablet by mouth daily        rosuvastatin (CRESTOR) 10 MG tablet 1/2 of 10mg by mouth once daily       tamsulosin (FLOMAX) 0.4 MG capsule Take 0.4 mg by mouth daily       warfarin (COUMADIN) 5 MG tablet TAKE 1/2 TO 1 TABLET BY MOUTH DAILY OR AS DIRECTED BY INR CLINIC           Reji Arriaza MD

## 2019-02-25 NOTE — PATIENT INSTRUCTIONS
Parkinson    Atrial fibrillation, status post ablation on August 15, but maintained on chronic Coumadin.  -s/p Watchman Left Atrial Appendage occlusion device implant 1/22/2019    Will get an echo in march and hopefully can get off warfarin.    May be going to start clopidogrel 75 mg daily if device is okay and will go off warfarin.   Long term he may go off clopidogrel and only be on aspirin.     He has not really had great benefit from the sinemet and has not had any specific side effects.         Medications     7am  8am  11am    530pm 9pm    Aspirin 81mg  1       Calcium carbonate 1500mg or 600mg   1          Carbidopa/levodopa Sinemet 25/100  1  1  1    Flecainide tambocor 100mg  1       1    Krill oil 500mg          1    Losartan cozaar 50mg  1   1    Metoprolol succinate toprol-xl 25mg 24 hr tablet    1       1   MVI ocuvite  1           Rosuvastatin crestor 10mg          1/2    Tamsulosin flomax 0.4mg  stopped       Warfarin coumadin 5mg           dose                                    History obtained from patient    He is continuing to do his exercises at home. He is going to the health club 3 days per week  Had started on the recumbent bike for 30 minutes and does leg curls and extensions, etc.     PLAN  Cardiology issues are being monitored and addressed - see above.     Will continue to have monitoring of his INR and his hemoglobin which was 11.4    He also has had some elevation in his blood glucose when checked in January it was 103.    No change in parkinson regimen    Return back.

## 2019-02-25 NOTE — Clinical Note
2/25/2019         RE: Mervin Caraballo  [address]  [facility] [address]        Dear Colleague,    Thank you for referring your patient, Mervin Caraballo, to the [facility]. Please see a copy of my visit note below.    No notes on file    Again, thank you for allowing me to participate in the care of your patient.        Sincerely,        Reji Arriaza MD

## 2019-06-10 NOTE — PROGRESS NOTES
Diagnosis/Summary/Recommendations:    PATIENT: Mervin Caraballo  78 year old male     : 1940    DEMARCO: 2019    Forgetfulness  Confusion  Unaware of things around him  Gets angry when explained h ow to do things  Had problems with changing a light bulb  More night time dream behavior.     He is remaining active.    Atrial fibrillation, status post ablation on August 15, but maintained on plavix and aspirin  -s/p Watchman Left Atrial Appendage occlusion device implant 2019    EKG 12 LEAD2018  [facility]  Component Name Value Ref Range   Interpretation Normal sinus rhythm  Normal ECG     Ventricular Rate 62 BPM    Atrial Rate 62 BPM    P-R Interval 206 ms   QRS Duration 102 ms    ms   QTc 448 ms   P Axis 94 degrees    R Axis -29 degrees    T Axis 45 degrees           Medications     7am  8am  11am    530pm 9pm    Aspirin 81mg   1           Calcium carbonate 1500mg or 600mg    1           Carbidopa/levodopa Sinemet 25/100  1   1   1     Clopidogrel plavix 75mg         Flecainide tambocor 100mg   1        1    Krill oil 500mg           1    Losartan cozaar 50mg   1     1     Metoprolol succinate toprol-xl 25mg 24 hr tablet     1        1   MVI ocuvite   1           Rosuvastatin crestor 10mg           1/2                                      History obtained from patient    PLAN  Neuropsychological evaluation to help determine if he has multidomain MCI and if he has anxiety  He will need an ECG to determine his QTc interval - he will probably do this with his pcp    Discussion about pharmacy consultation after his neuropsychological evaluation and his ECG    Discussion began about medication therapy    1. Mood medications    2. Cognitive enhancing medications   A. Rivastigmine (exelon) patch or pills  B. Donepezil (aricept)    Return back in 3 months.     ADDENDUM  HIS ECG SHOWED LBBB AND QTC IS NOW IN  RANGE  HE SHOULD GO BACK TO CARDIOLOGY TO  REVIEW HIS QTC INTERVAL, ETC. SO THAT WE CAN GET INPUT ABOUT WHETHER WE CAN USE CERTAIN MEDICATIONS as  WELL as INPUT ABOUT THE LBBB.      Coding statement:   Duration of  Services: patient care and care coordination was 25 minutes  Greater than 50% of this visit was spent in counseling and coordination of care.     Reji Arriaza MD     ______________________________________    Last visit date and details:      DEMARCO: February 25, 2019     Parkinson     Atrial fibrillation, status post ablation on August 15, but maintained on chronic Coumadin.  -s/p Watchman Left Atrial Appendage occlusion device implant 1/22/2019     Will get an echo in march and hopefully can get off warfarin.     May be going to start clopidogrel 75 mg daily if device is okay and will go off warfarin.   Long term he may go off clopidogrel and only be on aspirin.      He has not really had great benefit from the sinemet and has not had any specific side effects.           Medications     7am  8am  11am    530pm 9pm    Aspirin 81mg   1           Calcium carbonate 1500mg or 600mg    1           Carbidopa/levodopa Sinemet 25/100  1   1   1     Flecainide tambocor 100mg   1        1    Krill oil 500mg           1    Losartan cozaar 50mg   1     1     Metoprolol succinate toprol-xl 25mg 24 hr tablet     1        1   MVI ocuvite   1           Rosuvastatin crestor 10mg           1/2    Tamsulosin flomax 0.4mg   stopped           Warfarin coumadin 5mg            dose                                      History obtained from patient     He is continuing to do his exercises at home. He is going to the health club 3 days per week  Had started on the recumbent bike for 30 minutes and does leg curls and extensions, etc.      PLAN  Cardiology issues are being monitored and addressed - see above.      Will continue to have monitoring of his INR and his hemoglobin which was 11.4     He also has had some elevation in his blood glucose when checked in January it was  103.     No change in parkinson regimen     Return back.      ______________________________________      Patient was asked about 14 Review of systems including changes in vision (dry eyes, double vision), hearing, heart, lungs, musculoskeletal, depression, anxiety, snoring, RBD, insomnia, urinary frequency, urinary urgency, constipation, swallowing problems, hematological, ID, allergies, skin problems: seborrhea, endocrinological: thyroid, diabetes, cholesterol; balance, weight changes, and other neurological problems and these were not significant at this time except for   Patient Active Problem List   Diagnosis     ACP (advance care planning)     Anemia     Anticoagulant long-term use     Atrial fibrillation, currently in sinus rhythm     Bilateral lower extremity edema     Broken hip (H)     Cataract     Degenerative arthritis of lumbar spine     Encounter for monitoring Coumadin therapy     High blood pressure     HTN (hypertension)     Hyperlipidemia     Knee joint replacement status, left     MAXIME (obstructive sleep apnea)     Osteoporosis     Paroxysmal atrial fibrillation (H)     Prediabetes     Routine general medical examination at a health care facility     Primary osteoarthritis of left knee     S/p left hip fracture     Parkinson's disease (H)     Device in situ     A-fib (H)     Paroxysmal A-fib (H)          Allergies   Allergen Reactions     Lisinopril Cough     No Clinical Screening - See Comments      PN: LW FI1: food-nka     Past Surgical History:   Procedure Laterality Date     ARTHROSCOPY KNEE Right      ep cardioversion 2012, 2014, 2014 and 2015       ep studya/ablation 2015       EYE SURGERY Left     cataract left     HIP SURGERY Left 2018    fracture surgery     KNEE SURGERY Left     reconstruction     KNEE SURGERY Left 2017    replacement 2017     No past medical history on file.  Social History     Socioeconomic History     Marital status:      Spouse name: Not on file     Number of  children: Not on file     Years of education: Not on file     Highest education level: Not on file   Occupational History     Not on file   Social Needs     Financial resource strain: Not on file     Food insecurity:     Worry: Not on file     Inability: Not on file     Transportation needs:     Medical: Not on file     Non-medical: Not on file   Tobacco Use     Smoking status: Former Smoker     Smokeless tobacco: Never Used     Tobacco comment: quit 1965   Substance and Sexual Activity     Alcohol use: Yes     Drug use: Not on file     Sexual activity: Not on file   Lifestyle     Physical activity:     Days per week: Not on file     Minutes per session: Not on file     Stress: Not on file   Relationships     Social connections:     Talks on phone: Not on file     Gets together: Not on file     Attends Rastafari service: Not on file     Active member of club or organization: Not on file     Attends meetings of clubs or organizations: Not on file     Relationship status: Not on file     Intimate partner violence:     Fear of current or ex partner: Not on file     Emotionally abused: Not on file     Physically abused: Not on file     Forced sexual activity: Not on file   Other Topics Concern     Not on file   Social History Narrative    LIVES IN [place].  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  Medications and Vitals not listed above were documented in the cart and reviewed by me.     Current Outpatient Medications   Medication Sig Dispense Refill     aspirin 81 MG EC tablet 81mg tab by mouth daily @ 8am       calcium carbonate 600 mg-vitamin D 400 units (CALTRATE) 600-400 MG-UNIT per tablet Calcium by  mouth once daily @ 8/830pm       carbidopa-levodopa (SINEMET)  MG tablet 25/100 tab by mouth 3/day @7/8am, 11am-early afternoon, 3pm-7pm 270 tablet 3     flecainide (TAMBOCOR) 100 MG tablet 100mg tab by mouth twice daily @ 8am and 9pm       Krill Oil 500 MG CAPS 500mg capsule by mouth nightly @ 9pm       losartan (COZAAR) 50 MG tablet 50mg tab by mouth twice daily @ 8am and 530pm 180 tablet 3     metoprolol succinate ER (TOPROL-XL) 25 MG 24 hr tablet 25mg tab by mouth twice daily @ 8am and 9pm       multivitamin (OCUVITE) TABS tablet Ocuvite tab by mouth daily @ 8am 90 tablet 3     rosuvastatin (CRESTOR) 10 MG tablet 1/2 of 10mg by mouth nightly @ 9pm       warfarin (COUMADIN) 5 MG tablet TAKE 1/2 TO 1 TABLET BY MOUTH DAILY OR AS DIRECTED BY INR CLINIC           Reji Arriaza MD

## 2019-06-17 ENCOUNTER — OFFICE VISIT (OUTPATIENT)
Dept: NEUROLOGY | Facility: CLINIC | Age: 79
End: 2019-06-17
Payer: MEDICARE

## 2019-06-17 VITALS
BODY MASS INDEX: 34.75 KG/M2 | HEART RATE: 65 BPM | SYSTOLIC BLOOD PRESSURE: 131 MMHG | OXYGEN SATURATION: 99 % | DIASTOLIC BLOOD PRESSURE: 78 MMHG | WEIGHT: 270.8 LBS | HEIGHT: 74 IN

## 2019-06-17 DIAGNOSIS — G20.C PARKINSONISM, UNSPECIFIED PARKINSONISM TYPE (H): Primary | ICD-10-CM

## 2019-06-17 PROBLEM — D64.9 ANEMIA: Status: ACTIVE | Noted: 2018-07-25

## 2019-06-17 PROBLEM — R73.03 PREDIABETES: Status: ACTIVE | Noted: 2018-07-25

## 2019-06-17 PROCEDURE — 99214 OFFICE O/P EST MOD 30 MIN: CPT | Performed by: PSYCHIATRY & NEUROLOGY

## 2019-06-17 RX ORDER — CLOPIDOGREL BISULFATE 75 MG/1
1 TABLET ORAL DAILY
Refills: 4 | COMMUNITY
Start: 2019-04-11 | End: 2020-01-13

## 2019-06-17 ASSESSMENT — MIFFLIN-ST. JEOR: SCORE: 2028.09

## 2019-06-17 ASSESSMENT — PAIN SCALES - GENERAL: PAINLEVEL: NO PAIN (0)

## 2019-06-17 NOTE — LETTER
2019         RE: Mervin Caraballo  [address]  [facility] [address]        Dear Colleague,    Thank you for referring your patient, Mervin Caraballo, to the [facility]. Please see a copy of my visit note below.    Diagnosis/Summary/Recommendations:    PATIENT: Mervin Caraballo  78 year old male     : 1940    DEMARCO: 2019    Forgetfulness  Confusion  Unaware of things around him  Gets angry when explained h ow to do things  Had problems with changing a light bulb  More night time dream behavior.     He is remaining active.    Atrial fibrillation, status post ablation on August 15, but maintained on plavix and aspirin  -s/p Watchman Left Atrial Appendage occlusion device implant 2019    EKG 12 LEAD2018  [facility] & [facility]  Component Name Value Ref Range   Interpretation Normal sinus rhythm  Normal ECG     Ventricular Rate 62 BPM    Atrial Rate 62 BPM    P-R Interval 206 ms   QRS Duration 102 ms    ms   QTc 448 ms   P Axis 94 degrees    R Axis -29 degrees    T Axis 45 degrees           Medications     7am  8am  11am    530pm 9pm    Aspirin 81mg   1           Calcium carbonate 1500mg or 600mg    1           Carbidopa/levodopa Sinemet 25/100  1   1   1     Clopidogrel plavix 75mg         Flecainide tambocor 100mg   1        1    Krill oil 500mg           1    Losartan cozaar 50mg   1     1     Metoprolol succinate toprol-xl 25mg 24 hr tablet     1        1   MVI ocuvite   1           Rosuvastatin crestor 10mg           1/2                                      History obtained from patient    PLAN  Neuropsychological evaluation to help determine if he has multidomain MCI and if he has anxiety  He will need an ECG to determine his QTc interval - he will probably do this with his pcp    Discussion about pharmacy consultation after his neuropsychological evaluation and his ECG    Discussion began about medication therapy    1. Mood  medications    2. Cognitive enhancing medications   A. Rivastigmine (exelon) patch or pills  B. Donepezil (aricept)    Return back in 3 months.     ADDENDUM  HIS ECG SHOWED LBBB AND QTC IS NOW IN  RANGE  HE SHOULD GO BACK TO CARDIOLOGY TO REVIEW HIS QTC INTERVAL, ETC. SO THAT WE CAN GET INPUT ABOUT WHETHER WE CAN USE CERTAIN MEDICATIONS as  WELL as INPUT ABOUT THE LBBB.      Coding statement:   Duration of  Services: patient care and care coordination was 25 minutes  Greater than 50% of this visit was spent in counseling and coordination of care.     Reji Arriaza MD     ______________________________________    Last visit date and details:      DEMARCO: February 25, 2019     Parkinson     Atrial fibrillation, status post ablation on August 15, but maintained on chronic Coumadin.  -s/p Watchman Left Atrial Appendage occlusion device implant 1/22/2019     Will get an echo in march and hopefully can get off warfarin.     May be going to start clopidogrel 75 mg daily if device is okay and will go off warfarin.   Long term he may go off clopidogrel and only be on aspirin.      He has not really had great benefit from the sinemet and has not had any specific side effects.           Medications     7am  8am  11am    530pm 9pm    Aspirin 81mg   1           Calcium carbonate 1500mg or 600mg    1           Carbidopa/levodopa Sinemet 25/100  1   1   1     Flecainide tambocor 100mg   1        1    Krill oil 500mg           1    Losartan cozaar 50mg   1     1     Metoprolol succinate toprol-xl 25mg 24 hr tablet     1        1   MVI ocuvite   1           Rosuvastatin crestor 10mg           1/2    Tamsulosin flomax 0.4mg   stopped           Warfarin coumadin 5mg            dose                                      History obtained from patient     He is continuing to do his exercises at home. He is going to the health club 3 days per week  Had started on the recumbent bike for 30 minutes and does leg curls and extensions, etc.       PLAN  Cardiology issues are being monitored and addressed - see above.      Will continue to have monitoring of his INR and his hemoglobin which was 11.4     He also has had some elevation in his blood glucose when checked in January it was 103.     No change in parkinson regimen     Return back.      ______________________________________      Patient was asked about 14 Review of systems including changes in vision (dry eyes, double vision), hearing, heart, lungs, musculoskeletal, depression, anxiety, snoring, RBD, insomnia, urinary frequency, urinary urgency, constipation, swallowing problems, hematological, ID, allergies, skin problems: seborrhea, endocrinological: thyroid, diabetes, cholesterol; balance, weight changes, and other neurological problems and these were not significant at this time except for   Patient Active Problem List   Diagnosis     ACP (advance care planning)     Anemia     Anticoagulant long-term use     Atrial fibrillation, currently in sinus rhythm     Bilateral lower extremity edema     Broken hip (H)     Cataract     Degenerative arthritis of lumbar spine     Encounter for monitoring Coumadin therapy     High blood pressure     HTN (hypertension)     Hyperlipidemia     Knee joint replacement status, left     MAXIME (obstructive sleep apnea)     Osteoporosis     Paroxysmal atrial fibrillation (H)     Prediabetes     Routine general medical examination at a health care facility     Primary osteoarthritis of left knee     S/p left hip fracture     Parkinson's disease (H)     Device in situ     A-fib (H)     Paroxysmal A-fib (H)          Allergies   Allergen Reactions     Lisinopril Cough     No Clinical Screening - See Comments      PN: LW FI1: food-nka     Past Surgical History:   Procedure Laterality Date     ARTHROSCOPY KNEE Right      ep cardioversion 2012, 2014, 2014 and 2015       ep studya/ablation 2015       EYE SURGERY Left     cataract left     HIP SURGERY Left 2018    fracture  surgery     KNEE SURGERY Left     reconstruction     KNEE SURGERY Left 2017    replacement 2017     No past medical history on file.  Social History     Socioeconomic History     Marital status:      Spouse name: Not on file     Number of children: Not on file     Years of education: Not on file     Highest education level: Not on file   Occupational History     Not on file   Social Needs     Financial resource strain: Not on file     Food insecurity:     Worry: Not on file     Inability: Not on file     Transportation needs:     Medical: Not on file     Non-medical: Not on file   Tobacco Use     Smoking status: Former Smoker     Smokeless tobacco: Never Used     Tobacco comment: quit 1965   Substance and Sexual Activity     Alcohol use: Yes     Drug use: Not on file     Sexual activity: Not on file   Lifestyle     Physical activity:     Days per week: Not on file     Minutes per session: Not on file     Stress: Not on file   Relationships     Social connections:     Talks on phone: Not on file     Gets together: Not on file     Attends Judaism service: Not on file     Active member of club or organization: Not on file     Attends meetings of clubs or organizations: Not on file     Relationship status: Not on file     Intimate partner violence:     Fear of current or ex partner: Not on file     Emotionally abused: Not on file     Physically abused: Not on file     Forced sexual activity: Not on file   Other Topics Concern     Not on file   Social History Narrative    LIVES IN [place].  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  Medications and Vitals not listed above were documented in the cart  and reviewed by me.     Current Outpatient Medications   Medication Sig Dispense Refill     aspirin 81 MG EC tablet 81mg tab by mouth daily @ 8am       calcium carbonate 600 mg-vitamin D 400 units (CALTRATE) 600-400 MG-UNIT per tablet Calcium by mouth once daily @ 8/830pm       carbidopa-levodopa (SINEMET)  MG tablet 25/100 tab by mouth 3/day @7/8am, 11am-early afternoon, 3pm-7pm 270 tablet 3     flecainide (TAMBOCOR) 100 MG tablet 100mg tab by mouth twice daily @ 8am and 9pm       Krill Oil 500 MG CAPS 500mg capsule by mouth nightly @ 9pm       losartan (COZAAR) 50 MG tablet 50mg tab by mouth twice daily @ 8am and 530pm 180 tablet 3     metoprolol succinate ER (TOPROL-XL) 25 MG 24 hr tablet 25mg tab by mouth twice daily @ 8am and 9pm       multivitamin (OCUVITE) TABS tablet Ocuvite tab by mouth daily @ 8am 90 tablet 3     rosuvastatin (CRESTOR) 10 MG tablet 1/2 of 10mg by mouth nightly @ 9pm       warfarin (COUMADIN) 5 MG tablet TAKE 1/2 TO 1 TABLET BY MOUTH DAILY OR AS DIRECTED BY INR CLINIC           Reji Arriaza MD    Again, thank you for allowing me to participate in the care of your patient.        Sincerely,        Reji Arriaza MD

## 2019-06-17 NOTE — PATIENT INSTRUCTIONS
: 1940    DEMARCO: 2019    Forgetfulness  Confusion  Unaware of things around him  Gets angry when explained h ow to do things  Had problems with changing a light bulb  More night time dream behavior.     He is remaining active.    Atrial fibrillation, status post ablation on August 15, but maintained on plavix and aspirin  -s/p Watchman Left Atrial Appendage occlusion device implant 2019    EKG 12 LEAD2018  [facility]  Component Name Value Ref Range   Interpretation Normal sinus rhythm  Normal ECG     Ventricular Rate 62 BPM    Atrial Rate 62 BPM    P-R Interval 206 ms   QRS Duration 102 ms    ms   QTc 448 ms   P Axis 94 degrees    R Axis -29 degrees    T Axis 45 degrees           Medications     7am  8am  11am    530pm 9pm    Aspirin 81mg   1           Calcium carbonate 1500mg or 600mg    1           Carbidopa/levodopa Sinemet 25/100  1   1   1     Clopidogrel plavix 75mg         Flecainide tambocor 100mg   1        1    Krill oil 500mg           1    Losartan cozaar 50mg   1     1     Metoprolol succinate toprol-xl 25mg 24 hr tablet     1        1   MVI ocuvite   1           Rosuvastatin crestor 10mg           1/2                                      History obtained from patient    PLAN  Neuropsychological evaluation to help determine if he has multidomain MCI and if he has anxiety  He will need an ECG to determine his QTc interval - he will probably do this with his pcp    Discussion about pharmacy consultation after his neuropsychological evaluation and his ECG    Discussion began about medication therapy    1. Mood medications    2. Cognitive enhancing medications   A. Rivastigmine (exelon) patch or pills  B. Donepezil (aricept)    Return back in 3 months.       ADDENDUM  HIS ECG SHOWED LBBB AND QTC IS NOW IN  RANGE  HE SHOULD GO BACK TO CARDIOLOGY TO REVIEW HIS QTC INTERVAL, ETC. SO THAT WE CAN GET INPUT ABOUT WHETHER WE CAN USE CERTAIN  MEDICATIONS as  WELL as INPUT ABOUT THE LBBB.

## 2019-06-17 NOTE — NURSING NOTE
"Mervin Caraballo's goals for this visit include: return  He requests these members of his care team be copied on today's visit information:     PCP: Jenny Chew    Referring Provider:  No referring provider defined for this encounter.    /78   Pulse 65   Ht 1.88 m (6' 2\")   Wt 122.8 kg (270 lb 12.8 oz)   SpO2 99%   BMI 34.77 kg/m      Do you need any medication refills at today's visit? y  "

## 2019-07-09 ENCOUNTER — ALLIED HEALTH/NURSE VISIT (OUTPATIENT)
Dept: PHARMACY | Facility: CLINIC | Age: 79
End: 2019-07-09
Payer: COMMERCIAL

## 2019-07-09 DIAGNOSIS — G20.A1 PARKINSON'S DISEASE (H): Primary | ICD-10-CM

## 2019-07-09 DIAGNOSIS — I10 ESSENTIAL HYPERTENSION: ICD-10-CM

## 2019-07-09 DIAGNOSIS — I48.0 PAROXYSMAL ATRIAL FIBRILLATION (H): ICD-10-CM

## 2019-07-09 PROCEDURE — 99606 MTMS BY PHARM EST 15 MIN: CPT | Performed by: PHARMACIST

## 2019-07-09 PROCEDURE — 99607 MTMS BY PHARM ADDL 15 MIN: CPT | Performed by: PHARMACIST

## 2019-07-09 NOTE — PROGRESS NOTES
"SUBJECTIVE/OBJECTIVE:                           Mervin Caraballo is a 78 year old male called for an initial visit for Medication Therapy Management.  He was referred to me from Dr. Arriaza. Visit primarily conducted with Wife, Albina, given the patient's cognitive deficits.     Chief Complaint: Follow up from last MTM visit on 12/28/18 - would like to discuss rivastigmine    Allergies/ADRs: Reviewed in Epic  Tobacco: History of tobacco dependence - quit 30 years ago  Alcohol: 1-3 beverages / week    Medication Adherence/Access: no issues reported - uses [facility] pharmacy     Parkinson's Disease:  Current medications include: Carbidopa-levodopa  mg one tablet 3 times daily. Wife states that the patient's memory has been worse and he is having less \"awareness.\" She described that if he will ask her for the TV remote when it is sitting right next to him. They are interested in trying rivastigmine. They have not scheduled the neuropsychology testing yet but plan to do so.     Afib/HTN: Currently taking aspirin 81 mg daily, clopidogrel 75 mg daily, flecainide 100 mg twice daily, metoprolol succinate ER 25 mg twice daily, and losartan 50 mg twice daily. The patient underwent Watchman procedure on 1/22/19. Wife states that the patient has a cardiology visit on 7/24/19 for a final check of the Watchman and he may be able to go off clopidogrel at that time.    BP Readings from Last 3 Encounters:   06/17/19 131/78   02/25/19 116/70   12/10/18 144/84     Last vitals:   BP Readings from Last 1 Encounters:   06/17/19 131/78     Pulse Readings from Last 1 Encounters:   06/17/19 65     ASSESSMENT:                             Current medications were reviewed today.     Medication Adherence: excellent, no issues identified    Parkinson's Disease:  Needs improvement. Patient may benefit from a trial of an acetylcholinesterase inhibitor but Dr. Arriaza recommended first doing neuropsychology testing and that the patient discuss with " cardiology; neither of these visits have been done yet.     Afib/HTN: Needs improvement. Defer to cardiology for determination of ongoing clopidogrel use. Also will defer to their review of the patient's EKG and if it would be safe for him to be on an acetylcholinesterase inhibitor. Would need to monitor the patient's heart rate if he is to start rivastigmine.     PLAN:                            1. Schedule neuropsychology testing.   2. Follow up with cardiologist regarding ongoing use of clopidogrel and if it would be safe for him to try an acetylcholinesterase inhibitor.     I spent 20 minutes with this patient today. All changes were made via collaborative practice agreement with Dr. Arriaza. A copy of the visit note was provided to the patient's referring provider.    Will follow up in 3 months or sooner if needed.    The patient was mailed a summary of these recommendations as an after visit summary.     Amy Salgado, Pharm.D.  Medication Therapy Management Pharmacist  Phone: [phone]

## 2019-07-24 ENCOUNTER — TRANSFERRED RECORDS (OUTPATIENT)
Dept: HEALTH INFORMATION MANAGEMENT | Facility: CLINIC | Age: 79
End: 2019-07-24

## 2019-08-26 RX ORDER — CLOTRIMAZOLE 1 %
CREAM (GRAM) TOPICAL
Refills: 0 | COMMUNITY
Start: 2019-06-14 | End: 2020-01-22

## 2019-08-26 NOTE — PROGRESS NOTES
Diagnosis/Summary/Recommendations:    PATIENT: Mervin Caraballo  78 year old male     : 1940    DEMARCO: 2019    [facility], [place], operated by [facility] [place] [facility]    [address]    [address]    [place] [id number] [phone]   Kacey Leyva PA    [address]    [address]    [place] [id number] [phone] (Fax)   Ambulatory Care Plan          Medications     7am  8am  11am    530pm 9pm    Aspirin 81mg   1           Calcium carbonate 1500mg or 600mg    1           Carbidopa/levodopa Sinemet 25/100  1   1   1     Clopidogrel plavix 75mg               Clotrimazole lotrimin 1% external cream         Flecainide tambocor 100mg   1        1    Krill oil 500mg           1    Losartan cozaar 50mg   1     1     Metoprolol succinate toprol-xl 25mg 24 hr tablet     1        1   MVI ocuvite   1           Rosuvastatin crestor 10mg           1/2                                      History obtained from patient    SUMMARY  1. Discussed treatment options for  His lack of sleep and irritability   A. Mood medication - antidepressant  B. Antipsychotic seroquel (quetiapine) 1/2 of 25mg at betime  C. Cognitive enhancing medication rivastigmine patch (Exelon) - would need cardiac monitoring per cardiology    Moving at the end of October to [place] to a 1 level [facility].     Plan  THEY WILL CONTACT CARDIOLOGY LANG CANALES TO GET CLEARANCE of using quetiapine (seroquel) as it can prolong the QTC and if we use it how to monitor.     Will wait on trying the rivastigmine    Return back in 3 months    Involvement from Amy Salgado if needed.       Coding statement:   Duration of  Services: patient care and care coordination was 25 minutes  Greater than 50% of this visit was spent in counseling and coordination of care.     Reji Arriaza MD     ______________________________________    Last visit date and details:      DEMARCO: 2019     Forgetfulness  Confusion  Unaware of  things around him  Gets angry when explained h ow to do things  Had problems with changing a light bulb  More night time dream behavior.      He is remaining active.     Atrial fibrillation, status post ablation on August 15, but maintained on plavix and aspirin  -s/p Watchman Left Atrial Appendage occlusion device implant 1/22/2019     EKG 12 LEAD5/18/2018  [facility]  Component Name Value Ref Range   Interpretation Normal sinus rhythm  Normal ECG     Ventricular Rate 62 BPM    Atrial Rate 62 BPM    P-R Interval 206 ms   QRS Duration 102 ms    ms   QTc 448 ms   P Axis 94 degrees    R Axis -29 degrees    T Axis 45 degrees             Medications     7am  8am  11am    530pm 9pm    Aspirin 81mg   1           Calcium carbonate 1500mg or 600mg    1           Carbidopa/levodopa Sinemet 25/100  1   1   1     Clopidogrel plavix 75mg               Flecainide tambocor 100mg   1        1    Krill oil 500mg           1    Losartan cozaar 50mg   1     1     Metoprolol succinate toprol-xl 25mg 24 hr tablet     1        1   MVI ocuvite   1           Rosuvastatin crestor 10mg           1/2                                       History obtained from patient     PLAN  Neuropsychological evaluation to help determine if he has multidomain MCI and if he has anxiety  He will need an ECG to determine his QTc interval - he will probably do this with his pcp     Discussion about pharmacy consultation after his neuropsychological evaluation and his ECG     Discussion began about medication therapy     1. Mood medications     2. Cognitive enhancing medications   A. Rivastigmine (exelon) patch or pills  B. Donepezil (aricept)     Return back in 3 months.      ADDENDUM  HIS ECG SHOWED LBBB AND QTC IS NOW IN  RANGE  HE SHOULD GO BACK TO CARDIOLOGY TO REVIEW HIS QTC INTERVAL, ETC. SO THAT WE CAN GET INPUT ABOUT WHETHER WE CAN USE CERTAIN MEDICATIONS as  WELL as INPUT ABOUT THE LBBB.       Gordon  LANG Cheek - 2019 7:30 AM CDT  Formatting of this note might be different from the original.  [facility] [place]  [facility]  [address]  [place] [address]  Telephone [phone] Fax [phone]    Date of Service: 2019   Patient Name: Mervin Caraballo ([id number]) Gender: male : 1940, 78 y.o.     Primary Cardiologist: Dr. Lehman     Chief Complaint: Discuss new medication    History of the Present Illness:  This is a 78-year-old gentleman with the following diagnoses:  1. AFIB     status post successful ablation in 2015.     s/p watchman device   2. PVCs    21% on holter in  but EF Normal on echo     Treated with Flec 100 mg TWICE DAILY and Toprol 25 mg TWICE DAILY     Asymptomatic on the regimen.   3. Hypertension. Today 122/80.   4. Obesity.  5. Previous history of tobacco use.  6. Untreated sleep apnea.   7. Chronic anemia  8. parkinson's disease and gait instability   9. Watchman device    Mervin Caraballo is here for a F/U appt, he has the above PMH. He was recently in to see his neurologist for ongoing parkinson's care. Neurology would like preston start a new medication: Rivastigmine transdermal. This medication has potential to Enhance the effect of AVN blocking agents such as BB.     An EKG was completed in neurology office. This revealed a new LBBB. He was asked to F/u here today for approval to start the medication. Today EKG reveals that LBBB is no longer presented.     Patient Denies any new clinical changes. Specially he denies:   Denies anginal symptoms such as CP, Chest pressure or WALLACE  Denies SOB, PND, orthopnea, ankle swelling  Denies: lightheadedness, dizziness, syncopal or near syncopal events    Patients last stress test was  this was negative. His last EF eval was a KYM 3/2019 this revealed normal EF.     Past History  Patient Active Problem List   Diagnosis Date Noted      Paroxysmal A-fib (HC)   WATCHMAN implant 2019 [facility]      WATCHMAN implant 2019     Parkinson's disease (HC)   Dr Dunn      Bilateral lower extremity edema   Compression stockings      Encounter for monitoring Coumadin therapy 2018     S/p left hip fracture   S/p surgery 2018 (In [place])      Degenerative arthritis of lumbar spine   X-ray lumbar spine 2017:  1. No acute findings in the lumbar spine.  2. Mild degenerative changes in the upper mid lumbar spine, stable.  3. Mild anterior wedging of the T11 and T12 vertebral bodies, stable.  4. Osteopenia.      Anemia   SPEP/UPEP, Serum/Urine immunofixation unremarkable 10/2018  PBF unremarkable 10/2018  STFR/ferritin index 0.32 10/2018  B12, folic acid normal 10/2018  HEMOGLOBIN (g/dL)   Date Value   2019 11.4 (L)         Primary osteoarthritis of left knee   Left total hip arthroplasty    17      Osteoporosis   Calcium, vit D  DEXA  6/15: -2.6/-1.4      HTN (hypertension)   Metoprolol      Paroxysmal atrial fibrillation (HC)   Metoprolol, plavix  S/p Watchman procedure  Pulmonary vein isolation 2015  Dr Garcia      Hyperlipidemia   Crestor  Last Lipids:  Chol: 132 2018  T 2018  HDL: 45 2018  LDL: 68 2018  ALT (SGPT) (IU/L)   Date Value   2015 15         Preventive   Immunizations: see imm  Colonoscopy: , polyp, next in 5 years, advised 2017  PSA SCREEN (ng/mL)   Date Value   2006 0.45     TSH (UIU/mL)   Date Value   1/3/2013 1.62         Prediabetes   HEMOGLOBIN A1C SCREENING (%)   Date Value   2018 5.7         ACP (advance care planning) 2013   Patient has identified Health Care Agent(s): No  Add Health Care Agents: No  Patient has Advance Care Plan Documents (Health Care Directive, POLST): No, .    Patient has identified Specific Treatment Preferences: No   Specific limits to treatment preferences NOT identified: ASSUME FULL TREATMENT.        MAXIME (obstructive  sleep apnea)   not using C-PAP      Family History   Problem Relation Age of Onset     Diabetes Mother   CVA     Other Father   emphysema     Heart Disease Brother   dec.     Heart Disease Sister   dec.     Other Brother   dec.Srinivas. heart disease     Heart Disease Brother     Good Health Brother     Unknown Sister   dec.     Good Health Sister     Other   no children     Social History     Socioeconomic History     Marital status:    Spouse name: Not on file     Number of children: Not on file     Years of education: Not on file     Highest education level: Not on file   Occupational History     Occupation: retired    Employer: [employer] [id number]   Social Needs     Financial resource strain: Not on file     Food insecurity:   Worry: Not on file   Inability: Not on file     Transportation needs:   Medical: Not on file   Non-medical: Not on file   Tobacco Use     Smoking status: Former Smoker   Packs/day: 1.00   Years: 4.00   Pack years: 4.00   Types: Cigarettes   Last attempt to quit: 1965   Years since quittin.5     Smokeless tobacco: Never Used   Substance and Sexual Activity     Alcohol use: Yes   Alcohol/week: 0.0 oz   Frequency: Monthly or less   Drinks per session: 1 or 2   Binge frequency: Never   Comment: occasional beer every couple of weeks     Drug use: No     Sexual activity: Not on file   Lifestyle     Physical activity:   Days per week: Not on file   Minutes per session: Not on file     Stress: Not on file   Relationships     Social connections:   Talks on phone: Not on file   Gets together: Not on file   Attends [employer] service: Not on file   Active member of club or organization: Not on file   Attends meetings of clubs or organizations: Not on file   Relationship status: Not on file     Intimate partner violence:   Fear of current or ex partner: Not on file   Emotionally abused: Not on file   Physically abused: Not on file   Forced sexual activity: Not on file   Other Topics  "Concern     Caffeine Concern Yes   Comment: 1-2 a week /day     Exercise No   Social History Narrative   Lives in [place] with wife   1 son, 1 daughter   used to work in heavy construction     Updated Medication List From This Visit:  Current Outpatient Medications   Medication Sig Dispense Refill     aspirin (ECOTRIN) 81 mg enteric coated tablet Take 1 tablet by mouth once daily with a meal. S/P WATCHMAN implant 0     calcium carbonate (CALTRATE 600) 600 mg (1,500 mg) tablet Take 1,500 mg by mouth once daily. 0     carbidopa-levodopa,  mg, (SINEMET )  mg tablet Take 1 tablet TID     clotrimazole (LOTRIMIN) 1 % cream Apply topically to affected area(s) 2 times daily. 45 g 0     flecainide (TAMBOCOR) 100 mg tablet TAKE 1 TABLET BY MOUTH EVERY 12 HOURS 180 tablet 0     krill oil 500 mg cap Take 1 tablet by mouth once daily. 0     losartan (COZAAR) 50 mg tablet Take 1 tablet by mouth 2 times daily. 90 tablet 3     metoprolol succinate (TOPROL XL) 25 mg Sustained-Release tablet Take 1 tablet by mouth 2 times daily. 180 tablet 4     rosuvastatin (CRESTOR) 5 mg tablet Take 1 tablet by mouth at bedtime. 90 tablet 3     vitamin a-vitamin c-vitamin e-minerals (OCUVITE) tablet Take 1 tablet by mouth once daily. 0     No current facility-administered medications for this visit.     Medications have been reviewed by me and are current to the best of my knowledge and ability.    Allergies:   Allergies   Allergen Reactions     Lisinopril Cough     ROS: Per HPI    OBJECTIVE:  /76 (Cuff Site: Right Arm, Position: Sitting, Cuff Size: Adult Large)  Pulse 60  Ht 1.88 m (6' 2\")  Wt 122.9 kg (270 lb 14.4 oz)  BMI 34.78 kg/m      Wt Readings from Last 3 Encounters:   04/18/16 124.512 kg (274 lb 8 oz)   04/04/16 123.378 kg (272 lb)   11/19/15 120.657 kg (266 lb)     Physical Exam:  Gen: Alert, no acute distress  Lungs: Lungs CTA throughout. No wheezes, rhonchi or rales.  Heart:  Regular rate and rhythm, Normal " S1, S2. No murmurs, rubs or gallops.     Extremities: chronic 1+ lower extremity pitting edema.  Skin: Warm and dry.     ASSESSMENT:  1.) New LBBB present on EKG 6/17/19, repeat EKG in office 7/24/19: resolved.   - no New anginal symptoms or CHF  - Last Echo KYM: 3/19: EF 65%  - Last stress test 2013- Neg  - Patient Currently on Flec 100 TWICE DAILY For PVCs and h/o PAF    2. Frequent PVCs   - treated with Flec 100 TWICE DAILY And toprol 25 TWICE DAILY     3. PAF  - s/p PVI 2015, no A fib since  - watchman     4. HTN  - Controlled.     5. Parkinson's disease   - Gait instability     PLAN:  Will review EKGs with Dr. Lehman. Due to new LBBB, For now will hold on starting new med.     Notes:   Rivastigmine can enhance bradycardia effect/enhance AV blockade. At this time that does not appear to be a concern for patient . He denies any symptoms of bradycardia. And todays EKG reveals, Sinus rhythm, HR 59.     Rivastigmine is also NOT on the Qtc prolongation list.      CC:   MD Kacey Paige PA .................... 7/24/2019 9:39 AM        Electronically signed by Kacey Leyva PA at 07/24/2019 9:39 AM CDT    Nursing Notes  - documented in this encounter  Barbara Llanos - 07/24/2019 7:30 AM CDT  EKG was done today per order of Kacey Leyva and copy of EKG was given to patient. Barbara ZAMBRANO LPN 7/24/2019 8:10 AM      Electronically signed by Barbara Llanos at 07/24/2019 8:10 AM CDT       ______________________________________      Patient was asked about 14 Review of systems including changes in vision (dry eyes, double vision), hearing, heart, lungs, musculoskeletal, depression, anxiety, snoring, RBD, insomnia, urinary frequency, urinary urgency, constipation, swallowing problems, hematological, ID, allergies, skin problems: seborrhea, endocrinological: thyroid, diabetes, cholesterol; balance, weight changes, and other neurological problems and these were not significant at this time except for    Patient Active Problem List   Diagnosis     ACP (advance care planning)     Anemia     Anticoagulant long-term use     Atrial fibrillation, currently in sinus rhythm     Bilateral lower extremity edema     Broken hip (H)     Cataract     Degenerative arthritis of lumbar spine     Encounter for monitoring Coumadin therapy     High blood pressure     HTN (hypertension)     Hyperlipidemia     Knee joint replacement status, left     MAXIME (obstructive sleep apnea)     Osteoporosis     Paroxysmal atrial fibrillation (H)     Prediabetes     Routine general medical examination at a health care facility     Primary osteoarthritis of left knee     S/p left hip fracture     Parkinson's disease (H)     Device in situ     A-fib (H)     Paroxysmal A-fib (H)          Allergies   Allergen Reactions     Lisinopril Cough     No Clinical Screening - See Comments      PN: LW FI1: food-nka     Past Surgical History:   Procedure Laterality Date     ARTHROSCOPY KNEE Right      ep cardioversion 2012, 2014, 2014 and 2015       ep studya/ablation 2015       EYE SURGERY Left     cataract left     HIP SURGERY Left 2018    fracture surgery     KNEE SURGERY Left     reconstruction     KNEE SURGERY Left 2017    replacement 2017     No past medical history on file.  Social History     Socioeconomic History     Marital status:      Spouse name: Not on file     Number of children: Not on file     Years of education: Not on file     Highest education level: Not on file   Occupational History     Not on file   Social Needs     Financial resource strain: Not on file     Food insecurity:     Worry: Not on file     Inability: Not on file     Transportation needs:     Medical: Not on file     Non-medical: Not on file   Tobacco Use     Smoking status: Former Smoker     Smokeless tobacco: Never Used     Tobacco comment: quit 1965   Substance and Sexual Activity     Alcohol use: Yes     Drug use: Not on file     Sexual activity: Not on file    Lifestyle     Physical activity:     Days per week: Not on file     Minutes per session: Not on file     Stress: Not on file   Relationships     Social connections:     Talks on phone: Not on file     Gets together: Not on file     Attends Hinduism service: Not on file     Active member of club or organization: Not on file     Attends meetings of clubs or organizations: Not on file     Relationship status: Not on file     Intimate partner violence:     Fear of current or ex partner: Not on file     Emotionally abused: Not on file     Physically abused: Not on file     Forced sexual activity: Not on file   Other Topics Concern     Not on file   Social History Narrative    LIVES IN [place].  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  Medications and Vitals not listed above were documented in the cart and reviewed by me.     Current Outpatient Medications   Medication Sig Dispense Refill     aspirin 81 MG EC tablet 81mg tab by mouth daily @ 8am       calcium carbonate 600 mg-vitamin D 400 units (CALTRATE) 600-400 MG-UNIT per tablet Calcium by mouth once daily @ 8/830pm       carbidopa-levodopa (SINEMET)  MG tablet 25/100 tab by mouth 3/day @7/8am, 11am-early afternoon, 3pm-7pm 270 tablet 3     clopidogrel (PLAVIX) 75 MG tablet Take 1 tablet by mouth daily  4     clotrimazole (LOTRIMIN) 1 % external cream APPLY TO AFFECTED AREA TWICE A DAY  0     flecainide (TAMBOCOR) 100 MG tablet 100mg tab by mouth twice daily @ 8am and 9pm       Krill Oil 500 MG CAPS 500mg capsule by mouth nightly @ 9pm       losartan (COZAAR) 50 MG tablet 50mg tab by mouth twice daily @ 8am and 530pm 180 tablet 3     metoprolol  succinate ER (TOPROL-XL) 25 MG 24 hr tablet 25mg tab by mouth twice daily @ 8am and 9pm       multivitamin (OCUVITE) TABS tablet Ocuvite tab by mouth daily @ 8am 90 tablet 3     rosuvastatin (CRESTOR) 10 MG tablet 1/2 of 10mg by mouth nightly @ 9pm           Reji Arriaza MD

## 2019-09-16 ENCOUNTER — OFFICE VISIT (OUTPATIENT)
Dept: NEUROLOGY | Facility: CLINIC | Age: 79
End: 2019-09-16
Payer: MEDICARE

## 2019-09-16 VITALS
OXYGEN SATURATION: 99 % | DIASTOLIC BLOOD PRESSURE: 83 MMHG | SYSTOLIC BLOOD PRESSURE: 146 MMHG | WEIGHT: 271.6 LBS | BODY MASS INDEX: 34.86 KG/M2 | HEIGHT: 74 IN | HEART RATE: 61 BPM

## 2019-09-16 DIAGNOSIS — G20.C PARKINSONISM, UNSPECIFIED PARKINSONISM TYPE (H): Primary | ICD-10-CM

## 2019-09-16 PROCEDURE — 99214 OFFICE O/P EST MOD 30 MIN: CPT | Performed by: PSYCHIATRY & NEUROLOGY

## 2019-09-16 RX ORDER — QUETIAPINE FUMARATE 25 MG/1
TABLET, FILM COATED ORAL
Qty: 30 TABLET | Refills: 11 | Status: SHIPPED | OUTPATIENT
Start: 2019-09-16 | End: 2020-01-13

## 2019-09-16 ASSESSMENT — PAIN SCALES - GENERAL: PAINLEVEL: NO PAIN (0)

## 2019-09-16 ASSESSMENT — MIFFLIN-ST. JEOR: SCORE: 2031.72

## 2019-09-16 NOTE — PATIENT INSTRUCTIONS
DEMARCO: September 16, 2019    [facility] [place] [facility]    [address]    [address]    [place] [id number]    [phone]   Kacey Leyva PA    [address]    [address]    [place] [id number]    [phone] (Fax)   Ambulatory Care Plan            Medications     7am  8am  11am    530pm 9pm    Aspirin 81mg   1           Calcium carbonate 1500mg or 600mg    1           Carbidopa/levodopa Sinemet 25/100  1   1   1     Clopidogrel plavix 75mg               Clotrimazole lotrimin 1% external cream         Flecainide tambocor 100mg   1        1    Krill oil 500mg           1    Losartan cozaar 50mg   1     1     Metoprolol succinate toprol-xl 25mg 24 hr tablet     1        1   MVI ocuvite   1           Rosuvastatin crestor 10mg           1/2                                      History obtained from patient    SUMMARY  1. Discussed treatment options for  His lack of sleep and irritability   A. Mood medication - antidepressant  B. Antipsychotic seroquel (quetiapine) 1/2 of 25mg at [facility]  C. Cognitive enhancing medication rivastigmine patch (Exelon) - would need cardiac monitoring per cardiology    Moving at the end of October to [place] to a 1 level [facility].     Plan  THEY WILL CONTACT CARDIOLOGY LANG CANALES TO GET CLEARANCE of using quetiapine (seroquel) as it can prolong the QTC and if we use it how to monitor.     Will wait on trying the rivastigmine    Return back in 3 months    Involvement from Amy Salgado if needed.

## 2019-09-16 NOTE — NURSING NOTE
"Mervin Caraballo's goals for this visit include: return  He requests these members of his care team be copied on today's visit information:     PCP: Jenny Chew    Referring Provider:  No referring provider defined for this encounter.    BP (!) 146/83   Pulse 61   Ht 1.88 m (6' 2\")   Wt 123.2 kg (271 lb 9.6 oz)   SpO2 99%   BMI 34.87 kg/m      Do you need any medication refills at today's visit? n  "

## 2019-09-16 NOTE — LETTER
2019         RE: Mervin Caraballo  [address]  [place] [address]        Dear Colleague,    Thank you for referring your patient, Mervin Caraballo, to the [facility]. Please see a copy of my visit note below.    Diagnosis/Summary/Recommendations:    PATIENT: Mervin Caraballo  78 year old male     : 1940    DEMARCO: 2019    [facility] [place] [facility]    [address]    [address]    [place] [id number] [phone]   Kacey Leyva PA    [address]    [address]    [place] [id number] [phone] (Fax)   Ambulatory Care Plan          Medications     7am  8am  11am    530pm 9pm    Aspirin 81mg   1           Calcium carbonate 1500mg or 600mg    1           Carbidopa/levodopa Sinemet 25/100  1   1   1     Clopidogrel plavix 75mg               Clotrimazole lotrimin 1% external cream         Flecainide tambocor 100mg   1        1    Krill oil 500mg           1    Losartan cozaar 50mg   1     1     Metoprolol succinate toprol-xl 25mg 24 hr tablet     1        1   MVI ocuvite   1           Rosuvastatin crestor 10mg           1/2                                      History obtained from patient    SUMMARY  1. Discussed treatment options for  His lack of sleep and irritability   A. Mood medication - antidepressant  B. Antipsychotic seroquel (quetiapine) 1/2 of 25mg at betime  C. Cognitive enhancing medication rivastigmine patch (Exelon) - would need cardiac monitoring per cardiology    Moving at the end of October to [place] to a 1 level [facility].     Plan  THEY WILL CONTACT CARDIOLOGY LANG CANALES TO GET CLEARANCE of using quetiapine (seroquel) as it can prolong the QTC and if we use it how to monitor.     Will wait on trying the rivastigmine    Return back in 3 months    Involvement from Amy Salgado if needed.       Coding statement:   Duration of  Services: patient care and care coordination was 25  minutes  Greater than 50% of this visit was spent in counseling and coordination of care.     Reji Arriaza MD     ______________________________________    Last visit date and details:      DEMARCO: June 17, 2019     Forgetfulness  Confusion  Unaware of things around him  Gets angry when explained h ow to do things  Had problems with changing a light bulb  More night time dream behavior.      He is remaining active.     Atrial fibrillation, status post ablation on August 15, but maintained on plavix and aspirin  -s/p Watchman Left Atrial Appendage occlusion device implant 1/22/2019     EKG 12 LEAD5/18/2018  [facility]  Component Name Value Ref Range   Interpretation Normal sinus rhythm  Normal ECG     Ventricular Rate 62 BPM    Atrial Rate 62 BPM    P-R Interval 206 ms   QRS Duration 102 ms    ms   QTc 448 ms   P Axis 94 degrees    R Axis -29 degrees    T Axis 45 degrees             Medications     7am  8am  11am    530pm 9pm    Aspirin 81mg   1           Calcium carbonate 1500mg or 600mg    1           Carbidopa/levodopa Sinemet 25/100  1   1   1     Clopidogrel plavix 75mg               Flecainide tambocor 100mg   1        1    Krill oil 500mg           1    Losartan cozaar 50mg   1     1     Metoprolol succinate toprol-xl 25mg 24 hr tablet     1        1   MVI ocuvite   1           Rosuvastatin crestor 10mg           1/2                                       History obtained from patient     PLAN  Neuropsychological evaluation to help determine if he has multidomain MCI and if he has anxiety  He will need an ECG to determine his QTc interval - he will probably do this with his pcp     Discussion about pharmacy consultation after his neuropsychological evaluation and his ECG     Discussion began about medication therapy     1. Mood medications     2. Cognitive enhancing medications   A. Rivastigmine (exelon) patch or pills  B. Donepezil (aricept)     Return back in 3 months.       ADDENDUM  HIS ECG SHOWED LBBB AND QTC IS NOW IN  RANGE  HE SHOULD GO BACK TO CARDIOLOGY TO REVIEW HIS QTC INTERVAL, ETC. SO THAT WE CAN GET INPUT ABOUT WHETHER WE CAN USE CERTAIN MEDICATIONS as  WELL as INPUT ABOUT THE LBBB.       Kacey Leyva PA - 2019 7:30 AM CDT  Formatting of this note might be different from the original.  [facility] [place]  [facility]  [address]  [place] [address]  Telephone [phone] Fax [phone]    Date of Service: 2019   Patient Name: Mervin Caraballo ([id number]) Gender: male : 1940, 78 y.o.     Primary Cardiologist: Dr. Lehman     Chief Complaint: Discuss new medication    History of the Present Illness:  This is a 78-year-old gentleman with the following diagnoses:  1. AFIB     status post successful ablation in 2015.     s/p watchman device   2. PVCs    21% on holter in  but EF Normal on echo     Treated with Flec 100 mg TWICE DAILY and Toprol 25 mg TWICE DAILY     Asymptomatic on the regimen.   3. Hypertension. Today 122/80.   4. Obesity.  5. Previous history of tobacco use.  6. Untreated sleep apnea.   7. Chronic anemia  8. parkinson's disease and gait instability   9. Watchman device    Mervin Caraballo is here for a F/U appt, he has the above PMH. He was recently in to see his neurologist for ongoing parkinson's care. Neurology would like preston start a new medication: Rivastigmine transdermal. This medication has potential to Enhance the effect of AVN blocking agents such as BB.     An EKG was completed in neurology office. This revealed a new LBBB. He was asked to F/u here today for approval to start the medication. Today EKG reveals that LBBB is no longer presented.     Patient Denies any new clinical changes. Specially he denies:   Denies anginal symptoms such as CP, Chest pressure or WALLACE  Denies SOB, PND, orthopnea, ankle  swelling  Denies: lightheadedness, dizziness, syncopal or near syncopal events    Patients last stress test was  this was negative. His last EF eval was a KYM 3/2019 this revealed normal EF.     Past History  Patient Active Problem List   Diagnosis Date Noted     Paroxysmal A-fib (HC)   WATCHMAN implant 2019 [facility]      WATCHMAN implant 2019     Parkinson's disease (HC)   Dr Dunn      Bilateral lower extremity edema   Compression stockings      Encounter for monitoring Coumadin therapy 2018     S/p left hip fracture   S/p surgery 2018 (In [place])      Degenerative arthritis of lumbar spine   X-ray lumbar spine 2017:  1. No acute findings in the lumbar spine.  2. Mild degenerative changes in the upper mid lumbar spine, stable.  3. Mild anterior wedging of the T11 and T12 vertebral bodies, stable.  4. Osteopenia.      Anemia   SPEP/UPEP, Serum/Urine immunofixation unremarkable 10/2018  PBF unremarkable 10/2018  STFR/ferritin index 0.32 10/2018  B12, folic acid normal 10/2018  HEMOGLOBIN (g/dL)   Date Value   2019 11.4 (L)         Primary osteoarthritis of left knee   Left total hip arthroplasty    17      Osteoporosis   Calcium, vit D  DEXA  6/15: -2.6/-1.4      HTN (hypertension)   Metoprolol      Paroxysmal atrial fibrillation (HC)   Metoprolol, plavix  S/p Watchman procedure  Pulmonary vein isolation 2015  Dr Garcia      Hyperlipidemia   Crestor  Last Lipids:  Chol: 132 2018  T 2018  HDL: 45 2018  LDL: 68 2018  ALT (SGPT) (IU/L)   Date Value   2015 15         Preventive   Immunizations: see imm  Colonoscopy: , polyp, next in 5 years, advised 2017  PSA SCREEN (ng/mL)   Date Value   2006 0.45     TSH (UIU/mL)   Date Value   1/3/2013 1.62         Prediabetes   HEMOGLOBIN A1C SCREENING (%)   Date Value   2018 5.7         ACP (advance care planning) 2013   Patient has identified Health Care Agent(s): No  Add Health  Care Agents: No  Patient has Advance Care Plan Documents (Health Care Directive, POLST): No, .    Patient has identified Specific Treatment Preferences: No   Specific limits to treatment preferences NOT identified: ASSUME FULL TREATMENT.        MAXIME (obstructive sleep apnea)   not using C-PAP      Family History   Problem Relation Age of Onset     Diabetes Mother   CVA     Other Father   emphysema     Heart Disease Brother   dec.     Heart Disease Sister   dec.     Other Brother   dec.Srinivas. heart disease     Heart Disease Brother     Good Health Brother     Unknown Sister   dec.     Good Health Sister     Other   no children     Social History     Socioeconomic History     Marital status:    Spouse name: Not on file     Number of children: Not on file     Years of education: Not on file     Highest education level: Not on file   Occupational History     Occupation: retired    Employer: [employer] [id number]   Social Needs     Financial resource strain: Not on file     Food insecurity:   Worry: Not on file   Inability: Not on file     Transportation needs:   Medical: Not on file   Non-medical: Not on file   Tobacco Use     Smoking status: Former Smoker   Packs/day: 1.00   Years: 4.00   Pack years: 4.00   Types: Cigarettes   Last attempt to quit: 1965   Years since quittin.5     Smokeless tobacco: Never Used   Substance and Sexual Activity     Alcohol use: Yes   Alcohol/week: 0.0 oz   Frequency: Monthly or less   Drinks per session: 1 or 2   Binge frequency: Never   Comment: occasional beer every couple of weeks     Drug use: No     Sexual activity: Not on file   Lifestyle     Physical activity:   Days per week: Not on file   Minutes per session: Not on file     Stress: Not on file   Relationships     Social connections:   Talks on phone: Not on file   Gets together: Not on file   Attends Quaker service: Not on file   Active member of club or organization: Not on file   Attends meetings of  "clubs or organizations: Not on file   Relationship status: Not on file     Intimate partner violence:   Fear of current or ex partner: Not on file   Emotionally abused: Not on file   Physically abused: Not on file   Forced sexual activity: Not on file   Other Topics Concern     Caffeine Concern Yes   Comment: 1-2 a week /day     Exercise No   Social History Narrative   Lives in [place] with wife   1 son, 1 daughter   used to work in heavy construction     Updated Medication List From This Visit:  Current Outpatient Medications   Medication Sig Dispense Refill     aspirin (ECOTRIN) 81 mg enteric coated tablet Take 1 tablet by mouth once daily with a meal. S/P WATCHMAN implant 0     calcium carbonate (CALTRATE 600) 600 mg (1,500 mg) tablet Take 1,500 mg by mouth once daily. 0     carbidopa-levodopa,  mg, (SINEMET )  mg tablet Take 1 tablet TID     clotrimazole (LOTRIMIN) 1 % cream Apply topically to affected area(s) 2 times daily. 45 g 0     flecainide (TAMBOCOR) 100 mg tablet TAKE 1 TABLET BY MOUTH EVERY 12 HOURS 180 tablet 0     krill oil 500 mg cap Take 1 tablet by mouth once daily. 0     losartan (COZAAR) 50 mg tablet Take 1 tablet by mouth 2 times daily. 90 tablet 3     metoprolol succinate (TOPROL XL) 25 mg Sustained-Release tablet Take 1 tablet by mouth 2 times daily. 180 tablet 4     rosuvastatin (CRESTOR) 5 mg tablet Take 1 tablet by mouth at bedtime. 90 tablet 3     vitamin a-vitamin c-vitamin e-minerals (OCUVITE) tablet Take 1 tablet by mouth once daily. 0     No current facility-administered medications for this visit.     Medications have been reviewed by me and are current to the best of my knowledge and ability.    Allergies:   Allergies   Allergen Reactions     Lisinopril Cough     ROS: Per HPI    OBJECTIVE:  /76 (Cuff Site: Right Arm, Position: Sitting, Cuff Size: Adult Large)  Pulse 60  Ht 1.88 m (6' 2\")  Wt 122.9 kg (270 lb 14.4 oz)  BMI 34.78 kg/m      Wt Readings from " Last 3 Encounters:   04/18/16 124.512 kg (274 lb 8 oz)   04/04/16 123.378 kg (272 lb)   11/19/15 120.657 kg (266 lb)     Physical Exam:  Gen: Alert, no acute distress  Lungs: Lungs CTA throughout. No wheezes, rhonchi or rales.  Heart:  Regular rate and rhythm, Normal S1, S2. No murmurs, rubs or gallops.     Extremities: chronic 1+ lower extremity pitting edema.  Skin: Warm and dry.     ASSESSMENT:  1.) New LBBB present on EKG 6/17/19, repeat EKG in office 7/24/19: resolved.   - no New anginal symptoms or CHF  - Last Echo KYM: 3/19: EF 65%  - Last stress test 2013- Neg  - Patient Currently on Flec 100 TWICE DAILY For PVCs and h/o PAF    2. Frequent PVCs   - treated with Flec 100 TWICE DAILY And toprol 25 TWICE DAILY     3. PAF  - s/p PVI 2015, no A fib since  - watchman     4. HTN  - Controlled.     5. Parkinson's disease   - Gait instability     PLAN:  Will review EKGs with Dr. Lehman. Due to new LBBB, For now will hold on starting new med.     Notes:   Rivastigmine can enhance bradycardia effect/enhance AV blockade. At this time that does not appear to be a concern for patient . He denies any symptoms of bradycardia. And todays EKG reveals, Sinus rhythm, HR 59.     Rivastigmine is also NOT on the Qtc prolongation list.      CC:   MD Kacey Paige PA .................... 7/24/2019 9:39 AM        Electronically signed by Kacey Leyva PA at 07/24/2019 9:39 AM CDT    Nursing Notes  - documented in this encounter  Barbara Llanos - 07/24/2019 7:30 AM CDT  EKG was done today per order of Kacey Leyva and copy of EKG was given to patient. Barbara ZAMBRANO LPN 7/24/2019 8:10 AM      Electronically signed by Barbara Llanos at 07/24/2019 8:10 AM CDT       ______________________________________      Patient was asked about 14 Review of systems including changes in vision (dry eyes, double vision), hearing, heart, lungs, musculoskeletal, depression, anxiety, snoring, RBD, insomnia, urinary frequency,  urinary urgency, constipation, swallowing problems, hematological, ID, allergies, skin problems: seborrhea, endocrinological: thyroid, diabetes, cholesterol; balance, weight changes, and other neurological problems and these were not significant at this time except for   Patient Active Problem List   Diagnosis     ACP (advance care planning)     Anemia     Anticoagulant long-term use     Atrial fibrillation, currently in sinus rhythm     Bilateral lower extremity edema     Broken hip (H)     Cataract     Degenerative arthritis of lumbar spine     Encounter for monitoring Coumadin therapy     High blood pressure     HTN (hypertension)     Hyperlipidemia     Knee joint replacement status, left     MAXIME (obstructive sleep apnea)     Osteoporosis     Paroxysmal atrial fibrillation (H)     Prediabetes     Routine general medical examination at a health care facility     Primary osteoarthritis of left knee     S/p left hip fracture     Parkinson's disease (H)     Device in situ     A-fib (H)     Paroxysmal A-fib (H)          Allergies   Allergen Reactions     Lisinopril Cough     No Clinical Screening - See Comments      PN: LW FI1: food-nka     Past Surgical History:   Procedure Laterality Date     ARTHROSCOPY KNEE Right      ep cardioversion 2012, 2014, 2014 and 2015       ep studya/ablation 2015       EYE SURGERY Left     cataract left     HIP SURGERY Left 2018    fracture surgery     KNEE SURGERY Left     reconstruction     KNEE SURGERY Left 2017    replacement 2017     No past medical history on file.  Social History     Socioeconomic History     Marital status:      Spouse name: Not on file     Number of children: Not on file     Years of education: Not on file     Highest education level: Not on file   Occupational History     Not on file   Social Needs     Financial resource strain: Not on file     Food insecurity:     Worry: Not on file     Inability: Not on file     Transportation needs:     Medical: Not  on file     Non-medical: Not on file   Tobacco Use     Smoking status: Former Smoker     Smokeless tobacco: Never Used     Tobacco comment: quit 1965   Substance and Sexual Activity     Alcohol use: Yes     Drug use: Not on file     Sexual activity: Not on file   Lifestyle     Physical activity:     Days per week: Not on file     Minutes per session: Not on file     Stress: Not on file   Relationships     Social connections:     Talks on phone: Not on file     Gets together: Not on file     Attends Episcopal service: Not on file     Active member of club or organization: Not on file     Attends meetings of clubs or organizations: Not on file     Relationship status: Not on file     Intimate partner violence:     Fear of current or ex partner: Not on file     Emotionally abused: Not on file     Physically abused: Not on file     Forced sexual activity: Not on file   Other Topics Concern     Not on file   Social History Narrative    LIVES IN [place].  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  Medications and Vitals not listed above were documented in the cart and reviewed by me.     Current Outpatient Medications   Medication Sig Dispense Refill     aspirin 81 MG EC tablet 81mg tab by mouth daily @ 8am       calcium carbonate 600 mg-vitamin D 400 units (CALTRATE) 600-400 MG-UNIT per tablet Calcium by mouth once daily @ 8/830pm       carbidopa-levodopa (SINEMET)  MG tablet 25/100 tab by mouth 3/day @7/8am, 11am-early afternoon, 3pm-7pm 270 tablet 3     clopidogrel (PLAVIX) 75 MG tablet Take 1 tablet by mouth daily  4     clotrimazole (LOTRIMIN) 1 % external cream APPLY TO AFFECTED AREA TWICE A DAY  0      flecainide (TAMBOCOR) 100 MG tablet 100mg tab by mouth twice daily @ 8am and 9pm       Krill Oil 500 MG CAPS 500mg capsule by mouth nightly @ 9pm       losartan (COZAAR) 50 MG tablet 50mg tab by mouth twice daily @ 8am and 530pm 180 tablet 3     metoprolol succinate ER (TOPROL-XL) 25 MG 24 hr tablet 25mg tab by mouth twice daily @ 8am and 9pm       multivitamin (OCUVITE) TABS tablet Ocuvite tab by mouth daily @ 8am 90 tablet 3     rosuvastatin (CRESTOR) 10 MG tablet 1/2 of 10mg by mouth nightly @ 9pm           Reji Arriaza MD    Again, thank you for allowing me to participate in the care of your patient.        Sincerely,        Reji Arriaza MD

## 2019-09-29 ENCOUNTER — HEALTH MAINTENANCE LETTER (OUTPATIENT)
Age: 79
End: 2019-09-29

## 2019-12-02 DIAGNOSIS — G20.C PARKINSONISM, UNSPECIFIED PARKINSONISM TYPE (H): ICD-10-CM

## 2019-12-02 RX ORDER — CARBIDOPA AND LEVODOPA 25; 100 MG/1; MG/1
1 TABLET ORAL 3 TIMES DAILY
Qty: 270 TABLET | Refills: 3 | Status: SHIPPED | OUTPATIENT
Start: 2019-12-02 | End: 2020-01-13

## 2020-01-03 RX ORDER — ROSUVASTATIN CALCIUM 5 MG/1
TABLET, COATED ORAL
Refills: 3 | COMMUNITY
Start: 2019-10-19

## 2020-01-07 NOTE — PROGRESS NOTES
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     Missed a chair      [place] town[place] [facility] [place]    Atrial fibrillation, status post ablation on August 15, but maintained on aspirin  -s/p Watchman Left Atrial Appendage occlusion device implant 2019    2019 ECG 2019  HIS ECG SHOWED LBBB AND QTC IS NOW IN  RANGE    Was cleared by Kacey Leyva but may need another ECG if his dose of quetiapine would be increased to 1 full tablet and he is presently doing okay with a 1/2 tablet of seroquel     Medications     7am  8am  11am    530pm 9pm    Aspirin 81mg   1           Calcium carbonate 1500mg or 600mg               Carbidopa/levodopa Sinemet 25/100  1   1   1     Clopidogrel plavix 75mg  stopped             Clotrimazole lotrimin 1% external cream               Flecainide tambocor 100mg   1        1    Krill oil 500mg           1    Losartan cozaar 50mg   1     1     Metoprolol succinate toprol-xl 25mg 24 hr tablet     1        1   MVI ocuvite   1           Quetiapine seroquel 25mg      1/2   Rosuvastatin crestor 5mg           1                                      History obtained from patient     He has not done neuropsychological testing and probably would not tolerate it.     He is on quetiapine using 1/2 of 25mg tablet    He has not tried the rivastigmine    [facility] 12 LEAD2019  [facility]  Component Name Value Ref Range   Interpretation Sinus rhythm with 1st degree A-V block  Otherwise normal ECG     Ventricular Rate 63 BPM   Atrial Rate 63 BPM   P-R Interval 212 ms   QRS Duration 104 ms    ms   QTc 431 ms   P Axis 32 degrees   R Axis -25 degrees   T Axis 17 degrees     Status  Results Details     PLAN  Rivastigmine patch - it is cholinomimetic medication so can slow the pulse and may affect blood pressure.     Will be going back to cardiology at some point.     Most recent ECG is above.     Return back in 3 months.     Pharmacy consultation     Coding statement:   Duration of  Services: patient care and care coordination was 25 minutes  Greater than 50% of this visit was spent in counseling and coordination of care.     Reji Arriaza MD     ______________________________________    Last visit date and details:     SUMMARY  1. Discussed treatment options for  His lack of sleep and irritability   A. Mood medication - antidepressant  B. Antipsychotic seroquel (quetiapine) 1/2 of 25mg at betime  C. Cognitive enhancing medication rivastigmine patch (Exelon) - would need cardiac monitoring per cardiology     Moving at the end of October to [place] to a 1 level [facility].   Amy joshi       DEMARCO: September 16, 2019     [facility] [place]    [address]    [address]    [place], [place] [address] [phone]   Kacey Leyva PA    [address]    [address]    [place], [place] [address] [phone] (Fax)   Ambulatory Care Plan            Medications     7am  8am  11am    530pm 9pm    Aspirin 81mg   1           Calcium carbonate 1500mg or 600mg    1           Carbidopa/levodopa Sinemet 25/100  1   1   1     Clopidogrel plavix 75mg               Clotrimazole lotrimin 1% external cream               Flecainide tambocor 100mg   1        1    Krill oil 500mg           1    Losartan cozaar 50mg   1     1     Metoprolol succinate toprol-xl 25mg 24 hr tablet     1        1   MVI ocuvite   1           Rosuvastatin crestor 10mg           1/2                                       History obtained from patient     SUMMARY  1. Discussed treatment options for  His lack of sleep and irritability   A. Mood medication - antidepressant  B. Antipsychotic  seroquel (quetiapine) 1/2 of 25mg at betime  C. Cognitive enhancing medication rivastigmine patch (Exelon) - would need cardiac monitoring per cardiology     Moving at the end of October to [place] to a 1 level [facility].      Plan  THEY WILL CONTACT CARDIOLOGY LANG CANALES TO GET CLEARANCE of using quetiapine (seroquel) as it can prolong the QTC and if we use it how to monitor.      Will wait on trying the rivastigmine     Return back in 3 months             ______________________________________      Patient was asked about 14 Review of systems including changes in vision (dry eyes, double vision), hearing, heart, lungs, musculoskeletal, depression, anxiety, snoring, RBD, insomnia, urinary frequency, urinary urgency, constipation, swallowing problems, hematological, ID, allergies, skin problems: seborrhea, endocrinological: thyroid, diabetes, cholesterol; balance, weight changes, and other neurological problems and these were not significant at this time except for   Patient Active Problem List   Diagnosis     ACP (advance care planning)     Anemia     Anticoagulant long-term use     Atrial fibrillation, currently in sinus rhythm     Bilateral lower extremity edema     Broken hip (H)     Cataract     Degenerative arthritis of lumbar spine     Encounter for monitoring Coumadin therapy     High blood pressure     HTN (hypertension)     Hyperlipidemia     Knee joint replacement status, left     MAXIME (obstructive sleep apnea)     Osteoporosis     Paroxysmal atrial fibrillation (H)     Prediabetes     Routine general medical examination at a health care facility     Primary osteoarthritis of left knee     S/p left hip fracture     Parkinson's disease (H)     Device in situ     A-fib (H)     Paroxysmal A-fib (H)          Allergies   Allergen Reactions     Lisinopril Cough     No Clinical Screening - See Comments      PN: LW FI1: food-nka     Past Surgical History:   Procedure Laterality Date     ARTHROSCOPY KNEE Right       ep cardioversion 2012, 2014, 2014 and 2015       ep studya/ablation 2015       EYE SURGERY Left     cataract left     HIP SURGERY Left 2018    fracture surgery     KNEE SURGERY Left     reconstruction     KNEE SURGERY Left 2017    replacement 2017     No past medical history on file.  Social History     Socioeconomic History     Marital status:      Spouse name: Not on file     Number of children: Not on file     Years of education: Not on file     Highest education level: Not on file   Occupational History     Not on file   Social Needs     Financial resource strain: Not on file     Food insecurity:     Worry: Not on file     Inability: Not on file     Transportation needs:     Medical: Not on file     Non-medical: Not on file   Tobacco Use     Smoking status: Former Smoker     Smokeless tobacco: Never Used     Tobacco comment: quit 1965   Substance and Sexual Activity     Alcohol use: Yes     Drug use: Not on file     Sexual activity: Not on file   Lifestyle     Physical activity:     Days per week: Not on file     Minutes per session: Not on file     Stress: Not on file   Relationships     Social connections:     Talks on phone: Not on file     Gets together: Not on file     Attends Sikhism service: Not on file     Active member of club or organization: Not on file     Attends meetings of clubs or organizations: Not on file     Relationship status: Not on file     Intimate partner violence:     Fear of current or ex partner: Not on file     Emotionally abused: Not on file     Physically abused: Not on file     Forced sexual activity: Not on file   Other Topics Concern     Not on file   Social History Narrative    LIVES IN [place].  DEVON JAMES BROTHER       Drug and lactation database from the [place] National Library of Medicine:  http://toxnet.nlm.nih.gov/cgi-bin/sis/htmlgen?LACT      B/P: Data Unavailable, T: Data Unavailable, P: Data Unavailable, R: Data Unavailable 0 lbs 0 oz  There were no  vitals taken for this visit., There is no height or weight on file to calculate BMI.  Medications and Vitals not listed above were documented in the cart and reviewed by me.     Current Outpatient Medications   Medication Sig Dispense Refill     aspirin 81 MG EC tablet 81mg tab by mouth daily @ 8am       calcium carbonate 600 mg-vitamin D 400 units (CALTRATE) 600-400 MG-UNIT per tablet Calcium by mouth once daily @ 8/830pm       carbidopa-levodopa (SINEMET)  MG tablet Take 1 tablet by mouth 3 times daily 270 tablet 3     carbidopa-levodopa (SINEMET)  MG tablet 25/100 tab by mouth 3/day @7/8am, 11am-early afternoon, 3pm-7pm 270 tablet 3     clopidogrel (PLAVIX) 75 MG tablet Take 1 tablet by mouth daily  4     clotrimazole (LOTRIMIN) 1 % external cream APPLY TO AFFECTED AREA TWICE A DAY  0     flecainide (TAMBOCOR) 100 MG tablet 100mg tab by mouth twice daily @ 8am and 9pm       Krill Oil 500 MG CAPS 500mg capsule by mouth nightly @ 9pm       losartan (COZAAR) 50 MG tablet 50mg tab by mouth twice daily @ 8am and 530pm 180 tablet 3     metoprolol succinate ER (TOPROL-XL) 25 MG 24 hr tablet 25mg tab by mouth twice daily @ 8am and 9pm       multivitamin (OCUVITE) TABS tablet Ocuvite tab by mouth daily @ 8am 90 tablet 3     QUEtiapine (SEROQUEL) 25 MG tablet 1/2 x 25mg tab by mouth nightly for 1 week and may increase to 25mg tab night 30 tablet 11     rosuvastatin (CRESTOR) 10 MG tablet 1/2 of 10mg by mouth nightly @ 9pm       rosuvastatin (CRESTOR) 5 MG tablet TAKE 1 TABLET BY MOUTH EVERYDAY AT BEDTIME  3         Reji Arriaza MD

## 2020-01-13 ENCOUNTER — OFFICE VISIT (OUTPATIENT)
Dept: NEUROLOGY | Facility: CLINIC | Age: 80
End: 2020-01-13
Payer: MEDICARE

## 2020-01-13 VITALS
DIASTOLIC BLOOD PRESSURE: 78 MMHG | OXYGEN SATURATION: 97 % | HEART RATE: 66 BPM | BODY MASS INDEX: 35.31 KG/M2 | WEIGHT: 275 LBS | SYSTOLIC BLOOD PRESSURE: 131 MMHG

## 2020-01-13 DIAGNOSIS — E66.01 MORBID OBESITY (H): ICD-10-CM

## 2020-01-13 DIAGNOSIS — R41.89 COGNITIVE IMPAIRMENT: ICD-10-CM

## 2020-01-13 DIAGNOSIS — G20.C PARKINSONISM, UNSPECIFIED PARKINSONISM TYPE (H): Primary | ICD-10-CM

## 2020-01-13 PROBLEM — I48.0 PAROXYSMAL ATRIAL FIBRILLATION (H): Status: ACTIVE | Noted: 2018-07-25

## 2020-01-13 PROBLEM — Z00.00 ROUTINE GENERAL MEDICAL EXAMINATION AT A HEALTH CARE FACILITY: Status: ACTIVE | Noted: 2018-07-25

## 2020-01-13 PROBLEM — E78.5 HYPERLIPIDEMIA: Status: ACTIVE | Noted: 2018-07-25

## 2020-01-13 PROBLEM — G47.33 OSA (OBSTRUCTIVE SLEEP APNEA): Status: ACTIVE | Noted: 2018-07-25

## 2020-01-13 PROCEDURE — 99214 OFFICE O/P EST MOD 30 MIN: CPT | Performed by: PSYCHIATRY & NEUROLOGY

## 2020-01-13 RX ORDER — QUETIAPINE FUMARATE 25 MG/1
TABLET, FILM COATED ORAL
Qty: 90 TABLET | Refills: 3 | COMMUNITY
Start: 2020-01-13 | End: 2020-08-05

## 2020-01-13 RX ORDER — RIVASTIGMINE 9.5 MG/24H
PATCH, EXTENDED RELEASE TRANSDERMAL
Qty: 30 PATCH | Refills: 11 | Status: SHIPPED | OUTPATIENT
Start: 2020-01-13 | End: 2020-07-27

## 2020-01-13 RX ORDER — RIVASTIGMINE 4.6 MG/24H
PATCH, EXTENDED RELEASE TRANSDERMAL
Qty: 30 PATCH | Refills: 11 | Status: SHIPPED | OUTPATIENT
Start: 2020-01-13 | End: 2020-07-27

## 2020-01-13 NOTE — LETTER
2020         RE: Mervin Caraballo   [address]  [place] [address]        Dear Colleague,    Thank you for referring your patient, Mervin Caraballo, to the [facility]. Please see a copy of my visit note below.    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     Missed a chair      One level townhouse in [facility] [place]    Atrial fibrillation, status post ablation on August 15, but maintained on aspirin  -s/p Watchman Left Atrial Appendage occlusion device implant 2019    2019 ECG 2019  HIS ECG SHOWED LBBB AND QTC IS NOW IN  RANGE    Was cleared by Kacey Leyva but may need another ECG if his dose of quetiapine would be increased to 1 full tablet and he is presently doing okay with a 1/2 tablet of seroquel     Medications     7am  8am  11am    530pm 9pm    Aspirin 81mg   1           Calcium carbonate 1500mg or 600mg               Carbidopa/levodopa Sinemet 25/100  1   1   1     Clopidogrel plavix 75mg  stopped             Clotrimazole lotrimin 1% external cream               Flecainide tambocor 100mg   1        1    Krill oil 500mg           1    Losartan cozaar 50mg   1     1     Metoprolol succinate toprol-xl 25mg 24 hr tablet     1        1   MVI ocuvite   1           Quetiapine seroquel 25mg      1/2   Rosuvastatin crestor 5mg           1                                      History obtained from patient     He has not done neuropsychological testing and probably would not tolerate it.     He is on quetiapine using 1/2 of 25mg tablet    He has not tried the rivastigmine    EKG 12 LEAD2019  [employer] [facility]  Component Name Value Ref Range   Interpretation Sinus  rhythm with 1st degree A-V block  Otherwise normal ECG     Ventricular Rate 63 BPM   Atrial Rate 63 BPM   P-R Interval 212 ms   QRS Duration 104 ms    ms   QTc 431 ms   P Axis 32 degrees   R Axis -25 degrees   T Axis 17 degrees     Status Results Details     PLAN  Rivastigmine patch - it is cholinomimetic medication so can slow the pulse and may affect blood pressure.     Will be going back to cardiology at some point.     Most recent ECG is above.     Return back in 3 months.     Pharmacy consultation     Coding statement:   Duration of  Services: patient care and care coordination was 25 minutes  Greater than 50% of this visit was spent in counseling and coordination of care.     Reji Arriaza MD     ______________________________________    Last visit date and details:     SUMMARY  1. Discussed treatment options for  His lack of sleep and irritability   A. Mood medication - antidepressant  B. Antipsychotic seroquel (quetiapine) 1/2 of 25mg at betime  C. Cognitive enhancing medication rivastigmine patch (Exelon) - would need cardiac monitoring per cardiology     Moving at the end of October to [place] to a 1 level [facility].   Amy madelyn       DEMARCO: September 16, 2019     [facility]    [address]    [address]    [place], [place] [id number] [phone]   Kacey Leyva PA    [address]    [address]    [place], [place] [address] [phone] (Fax)   Ambulatory Care Plan            Medications     7am  8am  11am    530pm 9pm    Aspirin 81mg   1           Calcium carbonate 1500mg or 600mg    1           Carbidopa/levodopa Sinemet 25/100  1   1   1     Clopidogrel plavix 75mg               Clotrimazole lotrimin 1% external cream               Flecainide tambocor 100mg   1        1    Krill oil 500mg           1    Losartan cozaar 50mg   1     1     Metoprolol succinate toprol-xl 25mg 24 hr tablet     1        1   MVI ocuvite   1            Rosuvastatin crestor 10mg           1/2                                       History obtained from patient     SUMMARY  1. Discussed treatment options for  His lack of sleep and irritability   A. Mood medication - antidepressant  B. Antipsychotic seroquel (quetiapine) 1/2 of 25mg at [facility]  C. Cognitive enhancing medication rivastigmine patch (Exelon) - would need cardiac monitoring per cardiology     Moving at the end of October to [place] to a 1 level [facility].      Plan  THEY WILL CONTACT CARDIOLOGY LANG CANALES TO GET CLEARANCE of using quetiapine (seroquel) as it can prolong the QTC and if we use it how to monitor.      Will wait on trying the rivastigmine     Return back in 3 months             ______________________________________      Patient was asked about 14 Review of systems including changes in vision (dry eyes, double vision), hearing, heart, lungs, musculoskeletal, depression, anxiety, snoring, RBD, insomnia, urinary frequency, urinary urgency, constipation, swallowing problems, hematological, ID, allergies, skin problems: seborrhea, endocrinological: thyroid, diabetes, cholesterol; balance, weight changes, and other neurological problems and these were not significant at this time except for   Patient Active Problem List   Diagnosis     ACP (advance care planning)     Anemia     Anticoagulant long-term use     Atrial fibrillation, currently in sinus rhythm     Bilateral lower extremity edema     Broken hip (H)     Cataract     Degenerative arthritis of lumbar spine     Encounter for monitoring Coumadin therapy     High blood pressure     HTN (hypertension)     Hyperlipidemia     Knee joint replacement status, left     MAXIME (obstructive sleep apnea)     Osteoporosis     Paroxysmal atrial fibrillation (H)     Prediabetes     Routine general medical examination at a health care facility     Primary osteoarthritis of left knee     S/p left hip fracture     Parkinson's disease (H)     Device in situ      A-fib (H)     Paroxysmal A-fib (H)          Allergies   Allergen Reactions     Lisinopril Cough     No Clinical Screening - See Comments      PN: LW FI1: food-nka     Past Surgical History:   Procedure Laterality Date     ARTHROSCOPY KNEE Right      ep cardioversion 2012, 2014, 2014 and 2015       ep studya/ablation 2015       EYE SURGERY Left     cataract left     HIP SURGERY Left 2018    fracture surgery     KNEE SURGERY Left     reconstruction     KNEE SURGERY Left 2017    replacement 2017     No past medical history on file.  Social History     Socioeconomic History     Marital status:      Spouse name: Not on file     Number of children: Not on file     Years of education: Not on file     Highest education level: Not on file   Occupational History     Not on file   Social Needs     Financial resource strain: Not on file     Food insecurity:     Worry: Not on file     Inability: Not on file     Transportation needs:     Medical: Not on file     Non-medical: Not on file   Tobacco Use     Smoking status: Former Smoker     Smokeless tobacco: Never Used     Tobacco comment: quit 1965   Substance and Sexual Activity     Alcohol use: Yes     Drug use: Not on file     Sexual activity: Not on file   Lifestyle     Physical activity:     Days per week: Not on file     Minutes per session: Not on file     Stress: Not on file   Relationships     Social connections:     Talks on phone: Not on file     Gets together: Not on file     Attends Buddhism service: Not on file     Active member of club or organization: Not on file     Attends meetings of clubs or organizations: Not on file     Relationship status: Not on file     Intimate partner violence:     Fear of current or ex partner: Not on file     Emotionally abused: Not on file     Physically abused: Not on file     Forced sexual activity: Not on file   Other Topics Concern     Not on file   Social History Narrative    LIVES IN [place].  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        Drug and lactation database from the [place] National Library of Medicine:  http://toxnet.nlm.nih.gov/cgi-bin/sis/htmlgen?LACT      B/P: Data Unavailable, T: Data Unavailable, P: Data Unavailable, R: Data Unavailable 0 lbs 0 oz  There were no vitals taken for this visit., There is no height or weight on file to calculate BMI.  Medications and Vitals not listed above were documented in the cart and reviewed by me.     Current Outpatient Medications   Medication Sig Dispense Refill     aspirin 81 MG EC tablet 81mg tab by mouth daily @ 8am       calcium carbonate 600 mg-vitamin D 400 units (CALTRATE) 600-400 MG-UNIT per tablet Calcium by mouth once daily @ 8/830pm       carbidopa-levodopa (SINEMET)  MG tablet Take 1 tablet by mouth 3 times daily 270 tablet 3     carbidopa-levodopa (SINEMET)  MG tablet 25/100 tab by mouth 3/day @7/8am, 11am-early afternoon, 3pm-7pm 270 tablet 3     clopidogrel (PLAVIX) 75 MG tablet Take 1 tablet by mouth daily  4     clotrimazole (LOTRIMIN) 1 % external cream APPLY TO AFFECTED AREA TWICE A DAY  0     flecainide (TAMBOCOR) 100 MG tablet 100mg tab by mouth twice daily @ 8am and 9pm       Krill Oil 500 MG CAPS 500mg capsule by mouth nightly @ 9pm       losartan (COZAAR) 50 MG tablet 50mg tab by mouth twice daily @ 8am and 530pm 180 tablet 3     metoprolol succinate ER (TOPROL-XL) 25 MG 24 hr tablet 25mg tab by mouth twice daily @ 8am and 9pm       multivitamin (OCUVITE) TABS tablet Ocuvite tab by mouth daily @ 8am 90 tablet 3     QUEtiapine (SEROQUEL) 25 MG tablet 1/2 x 25mg tab by mouth nightly for 1 week and may increase to 25mg tab night 30 tablet 11     rosuvastatin (CRESTOR) 10 MG tablet 1/2 of 10mg by mouth nightly @ 9pm       rosuvastatin (CRESTOR) 5 MG tablet TAKE 1 TABLET BY MOUTH EVERYDAY AT BEDTIME  3         Reji Arriaza MD    Again, thank you for allowing me to participate in the care of your patient.        Sincerely,        Reji Arriaza MD

## 2020-01-13 NOTE — PATIENT INSTRUCTIONS
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     Missed a chair      [place] level townhouse [place] [facility] [place]    Atrial fibrillation, status post ablation on August 15, but maintained on aspirin  -s/p Watchman Left Atrial Appendage occlusion device implant 2019    2019 ECG 2019  HIS ECG SHOWED LBBB AND QTC IS NOW IN  RANGE    Was cleared by Kacey Leyva but may need another ECG if his dose of quetiapine would be increased to 1 full tablet and he is presently doing okay with a 1/2 tablet of seroquel     Medications     7am  8am  11am    530pm 9pm    Aspirin 81mg   1           Calcium carbonate 1500mg or 600mg               Carbidopa/levodopa Sinemet 25/100  1   1   1     Clopidogrel plavix 75mg  stopped             Clotrimazole lotrimin 1% external cream               Flecainide tambocor 100mg   1        1    Krill oil 500mg           1    Losartan cozaar 50mg   1     1     Metoprolol succinate toprol-xl 25mg 24 hr tablet     1        1   MVI ocuvite   1           Quetiapine seroquel 25mg      1/2   Rosuvastatin crestor 5mg           1                                      History obtained from patient     He has not done neuropsychological testing and probably would not tolerate it.     He is on quetiapine using 1/2 of 25mg tablet    He has not tried the rivastigmine    EKG 12 LEAD2019  [employer] [facility]  Component Name Value Ref Range   Interpretation Sinus rhythm with 1st degree A-V block  Otherwise normal ECG     Ventricular Rate 63 BPM   Atrial Rate 63 BPM   P-R Interval 212 ms   QRS Duration 104 ms    ms   QTc 431 ms   P Axis 32 degrees   R Axis -25 degrees   T Axis 17 degrees     Status Results Details     PLAN  Rivastigmine patch - it is cholinomimetic medication so can slow  the pulse and may affect blood pressure.     Will be going back to cardiology at some point.     Most recent ECG is above.     Return back in 3 months.     Pharmacy consultation

## 2020-01-13 NOTE — NURSING NOTE
Mervin Caraballo's goals for this visit include: Return  He requests these members of his care team be copied on today's visit information: yes    PCP: Jenny Chew    Referring Provider:  No referring provider defined for this encounter.    There were no vitals taken for this visit.    Do you need any medication refills at today's visit? no

## 2020-01-22 ENCOUNTER — ALLIED HEALTH/NURSE VISIT (OUTPATIENT)
Dept: PHARMACY | Facility: CLINIC | Age: 80
End: 2020-01-22
Payer: COMMERCIAL

## 2020-01-22 DIAGNOSIS — Z78.9 TAKES DIETARY SUPPLEMENTS: ICD-10-CM

## 2020-01-22 DIAGNOSIS — G20.A1 PARKINSON'S DISEASE (H): Primary | ICD-10-CM

## 2020-01-22 DIAGNOSIS — R41.89 COGNITIVE IMPAIRMENT: ICD-10-CM

## 2020-01-22 DIAGNOSIS — I48.0 PAROXYSMAL ATRIAL FIBRILLATION (H): ICD-10-CM

## 2020-01-22 DIAGNOSIS — E78.5 HYPERLIPIDEMIA, UNSPECIFIED HYPERLIPIDEMIA TYPE: ICD-10-CM

## 2020-01-22 DIAGNOSIS — I10 ESSENTIAL HYPERTENSION: ICD-10-CM

## 2020-01-22 PROCEDURE — 99605 MTMS BY PHARM NP 15 MIN: CPT | Performed by: PHARMACIST

## 2020-01-22 PROCEDURE — 99607 MTMS BY PHARM ADDL 15 MIN: CPT | Performed by: PHARMACIST

## 2020-01-22 NOTE — PROGRESS NOTES
SUBJECTIVE/OBJECTIVE:                Mervin Caraballo is a 79 year old male called for a follow-up visit for Medication Therapy Management.  He was referred to me from Dr. Arriaza. Visit conducted with wife, Albina, given the patient's cognitive impairment. Patient provided verbal approval for me to speak with his wife.     Chief Complaint: Follow up from [facility] visit on 7/9/19 - would like to discuss rivastigmine patch and has concerns about possible side effects listed in paperwork (tiredness, dizziness, black stools)  Tobacco:  reports that he has quit smoking. He has never used smokeless tobacco.  Alcohol: 1-3 beverages / week    Medication Adherence/Access: no issues reported    Parkinsonism: Continues to take carbidopa levodopa  mg 1 tablet 3 times daily.  No reported change in motor symptoms but wife is concerned about worsening of cognition.  He is staying active by going on stationary bike for 30 minutes most days and doing arm exercises. He has been taking quetiapine 12.5 mg nightly for hallucinations and wife states that this is been a very effective medication.  She is not interested in a higher dose of quetiapine at this time.    Cognitive impairment: Has not yet started rivastigmine patch but interested in doing this because of his worsening of cognition and short-term memory.    Afib/HTN: Patient is currently taking aspirin 81 mg for anticoagulation. Had [place] procedure and wife states it has been working well and no off warfarin. Patient reports no current concerns of bruising or bleeding. Patient does not have a hx of GI bleed.   Patient is also taking flecainide 100 mg twice daily and metoprolol XL 25 mg twice daily. Pt reports no current medication side effects.   Patient does not self-monitor BP but he does monitor heart rate via FitBit.    Hyperlipidemia: Current therapy includes rosuvastatin 5 mg once daily and krill oil daily (wife wondering if he can stop this).  Pt reports no significant  myalgias or other side effects.      Vitamins/supplements: Continues to take calcium daily and Ocuvite daily.     Last vitals:   BP Readings from Last 1 Encounters:   01/13/20 131/78     Pulse Readings from Last 1 Encounters:   01/13/20 66     ASSESSMENT:                Medication Adherence: excellent, no issues identified    Parkinsonism: Appears stable.     Cognitive impairment: Needs improvement.  Rivastigmine was previously discussed with the patient's cardiologist and they approved the initiation of this medication based on his vital signs.  We will continue to monitor for bradycardia as he starts on rivastigmine.    Afib/HTN: Appears stable.    Hyperlipidemia: Needs improvement.  I advised that the patient could stop taking Krill oil since he is on a statin and his cholesterol appears to be well controlled.    Vitamins/supplements: Stable.     PLAN:                  1. Start rivastigmine 4.6 mg patch daily x 1 month then increase to 9.5 mg patch daily x 1 month.  Educated wife to rotate application site and to monitor his pulse using the patient's FitBit (if pulse <60 to contact us).     2. Patient to consider stopping krill oil supplement.     I spent 30 minutes with this patient today. All changes were made via collaborative practice agreement with Dr. Arriaza. A copy of the visit note was provided to the patient's referring provider.     Will follow up in 2 months: 3/23/20.    The patient declined a summary of these recommendations as an after visit summary.    Amy Salgado, Pharm.D.  Medication Therapy Management Pharmacist  Phone: [phone]

## 2020-01-22 NOTE — Clinical Note
Cardiology previous ok'ed rivastigmine so we're going to start it and closely monitor his HR. He wears a Fitbit so that will help!

## 2020-01-23 ENCOUNTER — TELEPHONE (OUTPATIENT)
Dept: NEUROLOGY | Facility: CLINIC | Age: 80
End: 2020-01-23

## 2020-01-23 NOTE — TELEPHONE ENCOUNTER
----- Message from Amy Salgado [facility] sent at 1/22/2020  5:14 PM CST -----  Regarding: Consent to communicate  [facility] neuro team,    Can you work with this patient and his wife to get a consent to communicate form signed and scanned into his chart? He has cognitive impairment and we need to be able to speak to his wife.     Thank you,  Amy Salgado, Pharm.D.  Medication Therapy Management Pharmacist  Phone: [phone]

## 2020-01-24 NOTE — TELEPHONE ENCOUNTER
Consent to communicate mailed to the pt. He will sign and mail it back.   Once received, it will be sent to scanning.  Jessie Truong, RNCC  Neurology

## 2020-01-30 NOTE — TELEPHONE ENCOUNTER
Received voicemail from wife, Albina, stating she has a question about the consent to communicate form and would like a call back.     Amy Salgado, Pharm.D.  Medication Therapy Management Pharmacist  Phone: [phone]

## 2020-01-30 NOTE — TELEPHONE ENCOUNTER
I spoke with wife Albina and answered her questions regarding MEL.  Wife will mail back to [facility] [address] [place] [address].    Anika Lange LPN

## 2020-03-15 ENCOUNTER — HEALTH MAINTENANCE LETTER (OUTPATIENT)
Age: 80
End: 2020-03-15

## 2020-03-23 ENCOUNTER — TELEPHONE (OUTPATIENT)
Dept: PHARMACY | Facility: CLINIC | Age: 80
End: 2020-03-23

## 2020-03-23 NOTE — TELEPHONE ENCOUNTER
Attempted to reach patient for scheduled telemedicine MTM visit today. Left voicemail for him to call me back to reschedule the appointment if needed.     Amy Salgado, Pharm.D.  Medication Therapy Management Pharmacist  Phone: [phone]

## 2020-04-08 ENCOUNTER — TELEPHONE (OUTPATIENT)
Dept: NEUROLOGY | Facility: CLINIC | Age: 80
End: 2020-04-08

## 2020-06-10 NOTE — PROGRESS NOTES
VIDEO VISIT - am well      Date of Visit: July 27, 2020  Name: Mervin Caraballo  Date of Birth 1940  [place] [address]  [phone] (M)  [phone] (H)    [email or web address]    Has mychart    dfstaff    No proxy    Florinda Caraballo  [phone] mobile    Assessment:  (G20) Parkinsonism, unspecified Parkinsonism type (H)  (primary encounter diagnosis)  Junctional bradycardia  Orthostatic hypotension  Possible UTI  HTN  Lipid disorder  Left knee arthritis  LBBB    He has been taken off flecainide and metoprolol  His bp and heart rate are good  He is not having light headedness regularly  He has a cardiac monitoring and review his findings  Will see Dr. Carlyle Lehman    Had speech and physical therapy in home and nurse monitoring    Not clear if he has wearing off  6am, 1030a/11a, 3p  Eats at 730/8/830am, noon and 430pm    He is imaging that he is hearing things and doors are closing  Midodrine did not help his orthostatic bp issue = dose used?    Medications     7am  8am  11am    530pm 9pm    Aspirin 81mg   1           Calcium carbonate 1500mg or 600mg               Carbidopa/levodopa Sinemet 25/100  1   1   1     Clopidogrel plavix 75mg  stopped             Clotrimazole lotrimin 1% external cream               Flecainide tambocor 100mg   stopped       Krill oil 500mg           1    Losartan cozaar 50mg   1     1     Melatonin 5mg      1@8/9pm   Metoprolol succinate toprol-xl 25mg 24 hr tablet    stopped       MVI ocuvite   1           Quetiapine seroquel 25mg           1/4-1/2   Rivastigmine Exelon patch 24hr patch Not using t his        Rosuvastatin crestor 5mg           1   Tamsulosin flomax 0.4mg  not taking                          Plan:    They will be seeing Dr. Lehman this week to review his 2 weeks of cardiac monitoring  For now he is taking losartan 50mg 2/day rather than once per day  He is taking 1/4 to 1/2 of seroquel 25mg at night  He is on sinemet 1 tablet 3/day    Will  "need to hear back from family about whether he needs a pacemaker or a dose change in losartan  Will not increase the sinemet for now.  Will consider dose increase in seroquel from 1/4 to 1/2 but will wait to see what cardiology.    Return in 3 months.    I have reviewed the note as documented above.  This accurately captures the substance of my conversation with the patient.    Patient contact time  25  minutes. Over 50% of this visit was spent in patient care and care coordination.     Time of visit 825am -8 55am    Reji Arraiza MD      ------------------------------------------------------------------------------------------------------------------------------------------------------------------------    Video-Visit Details    The patient has been notified of following:     \"After a review of the patient s situation, this visit was changed from an in-person visit to a video visit to reduce the risk of COVID-19 exposure.   The patient is being evaluated via a billable video visit.\"    \"This video visit will be conducted via a call between you and your physician/provider. We have found that certain health care needs can be provided without the need for an in-person physical exam.  This service lets us provide the care you need with a video conversation.  If a prescription is necessary we can send it directly to your pharmacy.  If lab work is needed we can place an order for that and you can then stop by our lab to have the test done at a later time.    If during the course of the call the physician/provider feels a video visit is not appropriate, you will not be charged for this service.\"     Patient has given verbal consent for Video visit? Yes    Patient would like the video invitation sent by:     Type of service:  Video Visit    Video Start Time:     Video End Time (time video stopped):     Duration:  minutes - see above    Originating Location (pt. Location):     Distant Location (provider location):  [facility]     Mode of Communication:  Video Conference via [employer] (and if not possible then doximity)      Reji Arriaza MD      --------------------------------------------------------------------------------------------------------------    Mervin Caraballo is a 79 year old male who is being evaluated via a billable video visit.      Charts reviewed  Consult from  Images reviewed        I have reviewed and updated the patient's Past Medical History, Social History, Family History and Medication List.    ALLERGIES  Lisinopril and No clinical screening - see comments    Lasts visit details if there was a last visit:         Medications                                                                                                                                                                                                              14 Review of systems  are negative except for   Patient Active Problem List   Diagnosis     ACP (advance care planning)     Anemia     Anticoagulant long-term use     Atrial fibrillation, currently in sinus rhythm     Bilateral lower extremity edema     Broken hip (H)     Cataract     Degenerative arthritis of lumbar spine     Encounter for monitoring Coumadin therapy     High blood pressure     HTN (hypertension)     Hyperlipidemia     Knee joint replacement status, left     MAXIME (obstructive sleep apnea)     Osteoporosis     Paroxysmal atrial fibrillation (H)     Prediabetes     Routine general medical examination at a health care facility     Primary osteoarthritis of left knee     S/p left hip fracture     Parkinson's disease (H)     Device in situ     A-fib (H)     Paroxysmal A-fib (H)     Obesity (BMI 35.0-39.9) with comorbidity (H)        Allergies   Allergen Reactions     Lisinopril Cough     No Clinical Screening - See Comments      PN: LW FI1: food-nka     Past Surgical History:   Procedure Laterality Date     ARTHROSCOPY KNEE Right      ep cardioversion 2012,  2014, 2014 and 2015       ep studya/ablation 2015       EYE SURGERY Left     cataract left     HIP SURGERY Left 2018    fracture surgery     KNEE SURGERY Left     reconstruction     KNEE SURGERY Left 2017    replacement 2017     No past medical history on file.  Social History     Socioeconomic History     Marital status:      Spouse name: Not on file     Number of children: Not on file     Years of education: Not on file     Highest education level: Not on file   Occupational History     Not on file   Social Needs     Financial resource strain: Not on file     Food insecurity     Worry: Not on file     Inability: Not on file     Transportation needs     Medical: Not on file     Non-medical: Not on file   Tobacco Use     Smoking status: Former Smoker     Smokeless tobacco: Never Used     Tobacco comment: quit 1965   Substance and Sexual Activity     Alcohol use: Yes     Drug use: Not on file     Sexual activity: Not on file   Lifestyle     Physical activity     Days per week: Not on file     Minutes per session: Not on file     Stress: Not on file   Relationships     Social connections     Talks on phone: Not on file     Gets together: Not on file     Attends Rastafari service: Not on file     Active member of club or organization: Not on file     Attends meetings of clubs or organizations: Not on file     Relationship status: Not on file     Intimate partner violence     Fear of current or ex partner: Not on file     Emotionally abused: Not on file     Physically abused: Not on file     Forced sexual activity: Not on file   Other Topics Concern     Not on file   Social History Narrative    LIVES IN [place].  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     Family History   Problem Relation Age of Onset     Cerebrovascular Disease Mother      Diabetes Mother      Emphysema Father      Heart Failure Brother      Dementia Sister      Cerebrovascular Disease Sister      Heart Disease Sister      Current Outpatient Medications    Medication Sig Dispense Refill     aspirin 81 MG EC tablet 81mg tab by mouth daily @ 8am       calcium carbonate 600 mg-vitamin D 400 units (CALTRATE) 600-400 MG-UNIT per tablet Calcium by mouth once daily @ 8/830pm       carbidopa-levodopa (SINEMET)  MG tablet 25/100 tab by mouth 3/day @7/8am, 11am-early afternoon, 3pm-7pm 270 tablet 3     flecainide (TAMBOCOR) 100 MG tablet 100mg tab by mouth twice daily @ 8am and 9pm       losartan (COZAAR) 50 MG tablet 50mg tab by mouth twice daily @ 8am and 530pm 180 tablet 3     metoprolol succinate ER (TOPROL-XL) 25 MG 24 hr tablet 25mg tab by mouth twice daily @ 8am and 9pm       multivitamin (OCUVITE) TABS tablet Ocuvite tab by mouth daily @ 8am 90 tablet 3     QUEtiapine (SEROQUEL) 25 MG tablet 1/2 x 25mg tab by mouth nightly; can increase if needed 90 tablet 3     rivastigmine (EXELON) 4.6 MG/24HR 24 hr patch 4.6 mg patch daily x 1month then 9.5mg patch daily x 1 month then 13.3mg patch daily 30 patch 11     rivastigmine (EXELON) 9.5 MG/24HR 24 hr patch 4.6 mg patch daily x 1month then 9.5mg patch daily x 1 month then 13.3mg patch daily 30 patch 11     rosuvastatin (CRESTOR) 5 MG tablet TAKE 1 TABLET BY MOUTH EVERYDAY AT BEDTIME  3

## 2020-06-15 ENCOUNTER — TELEPHONE (OUTPATIENT)
Dept: NEUROLOGY | Facility: CLINIC | Age: 80
End: 2020-06-15

## 2020-06-15 NOTE — TELEPHONE ENCOUNTER
I called patient and left message for patient to call back and reschedule to video appt    Anika Lange LPN

## 2020-06-19 RX ORDER — MICONAZOLE NITRATE 20 MG/G
CREAM TOPICAL
COMMUNITY
Start: 2020-05-06

## 2020-07-20 ENCOUNTER — TELEPHONE (OUTPATIENT)
Dept: NEUROLOGY | Facility: CLINIC | Age: 80
End: 2020-07-20

## 2020-07-20 NOTE — TELEPHONE ENCOUNTER
I called patient and left message for patient to CB and reschedule to video appt    Anika Lange LPN

## 2020-07-26 RX ORDER — TAMSULOSIN HYDROCHLORIDE 0.4 MG/1
CAPSULE ORAL
COMMUNITY
Start: 2020-06-17 | End: 2020-07-27

## 2020-07-27 ENCOUNTER — VIRTUAL VISIT (OUTPATIENT)
Dept: NEUROLOGY | Facility: CLINIC | Age: 80
End: 2020-07-27
Payer: COMMERCIAL

## 2020-07-27 DIAGNOSIS — G20.C PARKINSONISM, UNSPECIFIED PARKINSONISM TYPE (H): Primary | ICD-10-CM

## 2020-07-27 PROCEDURE — 99214 OFFICE O/P EST MOD 30 MIN: CPT | Mod: 95 | Performed by: PSYCHIATRY & NEUROLOGY

## 2020-07-27 RX ORDER — QUETIAPINE FUMARATE 25 MG/1
TABLET, FILM COATED ORAL
COMMUNITY
Start: 2020-06-22 | End: 2020-08-05

## 2020-07-27 NOTE — PATIENT INSTRUCTIONS
Assessment:  (G20) Parkinsonism, unspecified Parkinsonism type (H)  (primary encounter diagnosis)  Junctional bradycardia  Orthostatic hypotension  Possible UTI  HTN  Lipid disorder  Left knee arthritis  LBBB    He has been taken off flecainide and metoprolol  His bp and heart rate are good  He is not having light headedness regularly  He has a cardiac monitoring and review his findings  Will see Dr. Carlyle Lehman    Had speech and physical therapy in home and nurse monitoring    Not clear if he has wearing off  6am, 1030a/11a, 3p  Eats at 730/8/830am, noon and 430pm    He is imaging that he is hearing things and doors are closing  Midodrine did not help his orthostatic bp issue = dose used?    Medications     7am  8am  11am    530pm 9pm    Aspirin 81mg   1           Calcium carbonate 1500mg or 600mg               Carbidopa/levodopa Sinemet 25/100  1   1   1     Clopidogrel plavix 75mg  stopped             Clotrimazole lotrimin 1% external cream               Flecainide tambocor 100mg   stopped       Krill oil 500mg           1    Losartan cozaar 50mg   1     1     Melatonin 5mg      1@8/9pm   Metoprolol succinate toprol-xl 25mg 24 hr tablet    stopped       MVI ocuvite   1           Quetiapine seroquel 25mg           1/4-1/2   Rivastigmine Exelon patch 24hr patch Not using t his        Rosuvastatin crestor 5mg           1   Tamsulosin flomax 0.4mg  not taking                          Plan:    They will be seeing Dr. Lehman this week to review his 2 weeks of cardiac monitoring  For now he is taking losartan 50mg 2/day rather than once per day  He is taking 1/4 to 1/2 of seroquel 25mg at night  He is on sinemet 1 tablet 3/day    Will need to hear back from family about whether he needs a pacemaker or a dose change in losartan  Will not increase the sinemet for now.  Will consider dose increase in seroquel from 1/4 to 1/2 but will wait to see what cardiology.    Return in 3 months.

## 2020-07-27 NOTE — LETTER
7/27/2020         RE: Mervin Caraballo  [address]  [place] [address]        Dear Colleague,    Thank you for referring your patient, Mervin Caraballo, to the [facility]. Please see a copy of my visit note below.        VIDEO VISIT - am well      Date of Visit: July 27, 2020  Name: Mervin Caraballo  Date of Birth 1940  [place] [address]  [phone] (M)  [phone] (H)    [email or web address]    Has mychart    dfstaff    No proxy    Florinda Caraballo  [phone] mobile    Assessment:  (G20) Parkinsonism, unspecified Parkinsonism type (H)  (primary encounter diagnosis)  Junctional bradycardia  Orthostatic hypotension  Possible UTI  HTN  Lipid disorder  Left knee arthritis  LBBB    He has been taken off flecainide and metoprolol  His bp and heart rate are good  He is not having light headedness regularly  He has a cardiac monitoring and review his findings  Will see Dr. aCrlyle Lehman    Had speech and physical therapy in home and nurse monitoring    Not clear if he has wearing off  6am, 1030a/11a, 3p  Eats at 730/8/830am, noon and 430pm    He is imaging that he is hearing things and doors are closing  Midodrine did not help his orthostatic bp issue = dose used?    Medications     7am  8am  11am    530pm 9pm    Aspirin 81mg   1           Calcium carbonate 1500mg or 600mg               Carbidopa/levodopa Sinemet 25/100  1   1   1     Clopidogrel plavix 75mg  stopped             Clotrimazole lotrimin 1% external cream               Flecainide tambocor 100mg   stopped       Krill oil 500mg           1    Losartan cozaar 50mg   1     1     Melatonin 5mg      1@8/9pm   Metoprolol succinate toprol-xl 25mg 24 hr tablet    stopped       MVI ocuvite   1           Quetiapine seroquel 25mg           1/4-1/2   Rivastigmine Exelon patch 24hr patch Not using t his        Rosuvastatin crestor 5mg           1   Tamsulosin flomax 0.4mg  not taking                       "    Plan:    They will be seeing Dr. Lehman this week to review his 2 weeks of cardiac monitoring  For now he is taking losartan 50mg 2/day rather than once per day  He is taking 1/4 to 1/2 of seroquel 25mg at night  He is on sinemet 1 tablet 3/day    Will need to hear back from family about whether he needs a pacemaker or a dose change in losartan  Will not increase the sinemet for now.  Will consider dose increase in seroquel from 1/4 to 1/2 but will wait to see what cardiology.    Return in 3 months.    I have reviewed the note as documented above.  This accurately captures the substance of my conversation with the patient.    Patient contact time  25  minutes. Over 50% of this visit was spent in patient care and care coordination.     Time of visit 825am -8 55am    Reji Arriaza MD      ------------------------------------------------------------------------------------------------------------------------------------------------------------------------    Video-Visit Details    The patient has been notified of following:     \"After a review of the patient s situation, this visit was changed from an in-person visit to a video visit to reduce the risk of COVID-19 exposure.   The patient is being evaluated via a billable video visit.\"    \"This video visit will be conducted via a call between you and your physician/provider. We have found that certain health care needs can be provided without the need for an in-person physical exam.  This service lets us provide the care you need with a video conversation.  If a prescription is necessary we can send it directly to your pharmacy.  If lab work is needed we can place an order for that and you can then stop by our lab to have the test done at a later time.    If during the course of the call the physician/provider feels a video visit is not appropriate, you will not be charged for this service.\"     Patient has given verbal consent for Video visit? Yes    Patient would " like the video invitation sent by:     Type of service:  Video Visit    Video Start Time:     Video End Time (time video stopped):     Duration:  minutes - see above    Originating Location (pt. Location):     Distant Location (provider location):  [facility]     Mode of Communication:  Video Conference via [facility] (and if not possible then doximity)      Reji Arriaza MD      --------------------------------------------------------------------------------------------------------------    Mervin Caraballo is a 79 year old male who is being evaluated via a billable video visit.      Charts reviewed  Consult from  Images reviewed        I have reviewed and updated the patient's Past Medical History, Social History, Family History and Medication List.    ALLERGIES  Lisinopril and No clinical screening - see comments    Lasts visit details if there was a last visit:         Medications                                                                                                                                                                                                              14 Review of systems  are negative except for   Patient Active Problem List   Diagnosis     ACP (advance care planning)     Anemia     Anticoagulant long-term use     Atrial fibrillation, currently in sinus rhythm     Bilateral lower extremity edema     Broken hip (H)     Cataract     Degenerative arthritis of lumbar spine     Encounter for monitoring Coumadin therapy     High blood pressure     HTN (hypertension)     Hyperlipidemia     Knee joint replacement status, left     MAXIME (obstructive sleep apnea)     Osteoporosis     Paroxysmal atrial fibrillation (H)     Prediabetes     Routine general medical examination at a health care facility     Primary osteoarthritis of left knee     S/p left hip fracture     Parkinson's disease (H)     Device in situ     A-fib (H)     Paroxysmal A-fib (H)     Obesity (BMI 35.0-39.9)  with comorbidity (H)        Allergies   Allergen Reactions     Lisinopril Cough     No Clinical Screening - See Comments      PN: LW FI1: food-nka     Past Surgical History:   Procedure Laterality Date     ARTHROSCOPY KNEE Right      ep cardioversion 2012, 2014, 2014 and 2015       ep studya/ablation 2015       EYE SURGERY Left     cataract left     HIP SURGERY Left 2018    fracture surgery     KNEE SURGERY Left     reconstruction     KNEE SURGERY Left 2017    replacement 2017     No past medical history on file.  Social History     Socioeconomic History     Marital status:      Spouse name: Not on file     Number of children: Not on file     Years of education: Not on file     Highest education level: Not on file   Occupational History     Not on file   Social Needs     Financial resource strain: Not on file     Food insecurity     Worry: Not on file     Inability: Not on file     Transportation needs     Medical: Not on file     Non-medical: Not on file   Tobacco Use     Smoking status: Former Smoker     Smokeless tobacco: Never Used     Tobacco comment: quit 1965   Substance and Sexual Activity     Alcohol use: Yes     Drug use: Not on file     Sexual activity: Not on file   Lifestyle     Physical activity     Days per week: Not on file     Minutes per session: Not on file     Stress: Not on file   Relationships     Social connections     Talks on phone: Not on file     Gets together: Not on file     Attends Faith service: Not on file     Active member of club or organization: Not on file     Attends meetings of clubs or organizations: Not on file     Relationship status: Not on file     Intimate partner violence     Fear of current or ex partner: Not on file     Emotionally abused: Not on file     Physically abused: Not on file     Forced sexual activity: Not on file   Other Topics Concern     Not on file   Social History Narrative    LIVES IN [place].  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     Family History   Problem  Relation Age of Onset     Cerebrovascular Disease Mother      Diabetes Mother      Emphysema Father      Heart Failure Brother      Dementia Sister      Cerebrovascular Disease Sister      Heart Disease Sister      Current Outpatient Medications   Medication Sig Dispense Refill     aspirin 81 MG EC tablet 81mg tab by mouth daily @ 8am       calcium carbonate 600 mg-vitamin D 400 units (CALTRATE) 600-400 MG-UNIT per tablet Calcium by mouth once daily @ 8/830pm       carbidopa-levodopa (SINEMET)  MG tablet 25/100 tab by mouth 3/day @7/8am, 11am-early afternoon, 3pm-7pm 270 tablet 3     flecainide (TAMBOCOR) 100 MG tablet 100mg tab by mouth twice daily @ 8am and 9pm       losartan (COZAAR) 50 MG tablet 50mg tab by mouth twice daily @ 8am and 530pm 180 tablet 3     metoprolol succinate ER (TOPROL-XL) 25 MG 24 hr tablet 25mg tab by mouth twice daily @ 8am and 9pm       multivitamin (OCUVITE) TABS tablet Ocuvite tab by mouth daily @ 8am 90 tablet 3     QUEtiapine (SEROQUEL) 25 MG tablet 1/2 x 25mg tab by mouth nightly; can increase if needed 90 tablet 3     rivastigmine (EXELON) 4.6 MG/24HR 24 hr patch 4.6 mg patch daily x 1month then 9.5mg patch daily x 1 month then 13.3mg patch daily 30 patch 11     rivastigmine (EXELON) 9.5 MG/24HR 24 hr patch 4.6 mg patch daily x 1month then 9.5mg patch daily x 1 month then 13.3mg patch daily 30 patch 11     rosuvastatin (CRESTOR) 5 MG tablet TAKE 1 TABLET BY MOUTH EVERYDAY AT BEDTIME  3                       Again, thank you for allowing me to participate in the care of your patient.        Sincerely,        Reji Arriaza MD

## 2020-08-04 DIAGNOSIS — G20.C PARKINSONISM, UNSPECIFIED PARKINSONISM TYPE (H): Primary | ICD-10-CM

## 2020-08-05 RX ORDER — QUETIAPINE FUMARATE 25 MG/1
TABLET, FILM COATED ORAL
Qty: 90 TABLET | Refills: 3 | COMMUNITY
Start: 2020-08-05 | End: 2020-09-10

## 2020-08-05 NOTE — TELEPHONE ENCOUNTER
Rx Authorization:    Requested Medication/ Dose: Seroquel 25    Date last refill ordered:     Quantity ordered:     # refills:     Date of last clinic visit with ordering provider: 7/27/20    Date of next clinic visit with ordering provider: 0    All pertinent protocol data (lab date/result):     Include pertinent information from patients message:

## 2020-08-05 NOTE — TELEPHONE ENCOUNTER
Spoke to the pt's spouse who verified that the current dose of Quetiapine is 1/2 tablet at bedtime on even days and 1/4 tablet at bedtime on odd days. Medication list updated. She confirmed that they do not need a refill at this time.  They will call when they get closer to running out.  Jessie Truong, RNCC  Neurology

## 2020-09-10 DIAGNOSIS — G20.C PARKINSONISM, UNSPECIFIED PARKINSONISM TYPE (H): ICD-10-CM

## 2020-09-10 RX ORDER — QUETIAPINE FUMARATE 25 MG/1
TABLET, FILM COATED ORAL
Qty: 90 TABLET | Refills: 3 | COMMUNITY
Start: 2020-09-10 | End: 2020-12-14

## 2020-10-06 ENCOUNTER — TELEPHONE (OUTPATIENT)
Dept: NEUROLOGY | Facility: CLINIC | Age: 80
End: 2020-10-06

## 2020-10-06 NOTE — TELEPHONE ENCOUNTER
10/6 3rd attempt. Provided phone number [phone] to schedule in about 3 months (around 10/27/2020).    Luisana Carranza   Procedure    Ortho/Sports Med/Pod/Ent/Eye/Surgical Specialties  [facility] [place]   [phone]

## 2020-12-02 RX ORDER — AMOXICILLIN 500 MG/1
CAPSULE ORAL
COMMUNITY
Start: 2020-09-14

## 2020-12-02 RX ORDER — METOPROLOL SUCCINATE 25 MG/1
TABLET, EXTENDED RELEASE ORAL
COMMUNITY
Start: 2020-05-23 | End: 2020-12-02

## 2020-12-02 RX ORDER — FLECAINIDE ACETATE 100 MG/1
TABLET ORAL
COMMUNITY
Start: 2020-04-01 | End: 2020-12-02

## 2020-12-02 RX ORDER — CEPHALEXIN 500 MG/1
CAPSULE ORAL
COMMUNITY
Start: 2020-09-14

## 2020-12-03 NOTE — PROGRESS NOTES
VIDEO VISIT    Date of Visit: December 14, 2020  Name: Mervin Caraballo  Date of Birth 1940  [place] [address]  [phone] (M)  [phone] (H)     [email or web address]     Has mychart     dfstaff     No proxy     Alice Caraballo  [phone] mobile    Assessment:  (G20) Parkinson's disease (H)  (primary encounter diagnosis)  He is on sinemet 1 tablet 3/day 25/100 @ 7/8am, 11am/early afternoon and 3-7pm  Not clear if he has wearing off  6am, 1030a/11a, 3p  Eats at 730/8/830am, noon and 430pm  No falls  He slipped; near falls    Had speech and physical therapy in home and nurse monitoring  No home schedule. They are not going to the gym. He was having knee problems and so stopped doing this.      He is imaging that he is hearing things and doors are closing  1/2 of seroquel 25mg at night    No recent UTI    Mood - a bit of mood problems. He has had some mood swings.   He may get mad at times.     Cognition - has confusion at times  He has not tried the rivastigmine patch     Lipid disorder  Rosuvastatin crestor 5mg at night  No diabetes or thyroid problem    Left knee arthritis    Sleep  Melatonin 5mg @ 8pm  He falls asleep and hears something and wakes up  Someone knocking at the door, dog barking or phone ringing.     LBBB  Junctional bradycardia  HTN - stable  Orthostatic hypotension    Not taking flecainide  Not taking metoprolol  His bp and heart rate are good  He is not having light headedness regularly  Dr. Carlyle Lehman  Midodrine did not help his orthostatic bp issue - not using  2 weeks of cardiac monitoring  Losartan cozaar 50mg 2/day   No need for pacemaker       Medications     7am  8am  11am    530pm 8/9pm    Amoxicillin amoxil 500mg Prior to  dental         Aspirin 81mg   1           Calcium carbonate 1500mg or 600mg           1    Carbidopa/levodopa Sinemet 25/100  1   1   1     Cephalexin keflex 500mg Prior to dental        Clotrimazole lotrimin 1% external cream          "      Krill oil 500mg           1    Losartan cozaar 50mg   1     1     Melatonin 5mg           1@8/9pm   MVI ocuvite   1           Quetiapine seroquel 25mg           1/2   Rivastigmine Exelon patch 24hr patch Not using              Rosuvastatin crestor 5mg           1   Tamsulosin flomax 0.4mg  not taking                                Plan:    Chair yoga or activities that are seated due to knee problems.     Increase the seroquel (Quetiapine) from 1/2 to 1 full 25mg tab at bedtime    Consultation with Amy Salgado pharmacist    Rivastigmine (exelon) patch sent to pharmacy but wait till seen by Amy Salgado    Discussed that nuplazid (pimavanserin) will require prior authorization and is used for hallucinations - dose is 34mg/day    Discussed that clozapine (clozaril) is another medication for hallucinations but would require blood monitoring.     Return in 3 months.    I have reviewed the note as documented above.  This accurately captures the substance of my conversation with the patient.    Patient contact time  25  minutes. Over 50% of this visit was spent in patient care and care coordination.     Time of visit 345pm - 415pm    Reji Arriaza MD      ------------------------------------------------------------------------------------------------------------------------------------------------------------------------    Video-Visit Details    The patient has been notified of following:     \"After a review of the patient s situation, this visit was changed from an in-person visit to a video visit to reduce the risk of COVID-19 exposure.   The patient is being evaluated via a billable video visit.\"    \"This video visit will be conducted via a call between you and your physician/provider. We have found that certain health care needs can be provided without the need for an in-person physical exam.  This service lets us provide the care you need with a video conversation.  If a prescription is necessary we can send it " "directly to your pharmacy.  If lab work is needed we can place an order for that and you can then stop by our lab to have the test done at a later time.    If during the course of the call the physician/provider feels a video visit is not appropriate, you will not be charged for this service.\"     Patient has given verbal consent for Video visit? Yes    Patient would like the video invitation sent by:     Type of service:  Video Visit    Video Start Time:     Video End Time (time video stopped):     Duration:  minutes - see above    Originating Location (pt. Location):     Distant Location (provider location):  [facility] [place]     Mode of Communication:  Video Conference via [facility] (and if not possible then doximity)      Reji Arriaza MD      --------------------------------------------------------------------------------------------------------------    Mervin Caraballo is a 79 year old male who is being evaluated via a billable video visit.      Charts reviewed  Consult from  Images reviewed        I have reviewed and updated the patient's Past Medical History, Social History, Family History and Medication List.    ALLERGIES  Lisinopril and No clinical screening - see comments    Lasts visit details if there was a last visit:         Medications                                                                                                                                                                                                              14 Review of systems  are negative except for   Patient Active Problem List   Diagnosis     ACP (advance care planning)     Anemia     Anticoagulant long-term use     Atrial fibrillation, currently in sinus rhythm     Bilateral lower extremity edema     Broken hip (H)     Cataract     Degenerative arthritis of lumbar spine     Encounter for monitoring Coumadin therapy     High blood pressure     HTN (hypertension)     Hyperlipidemia     " Knee joint replacement status, left     MAXIME (obstructive sleep apnea)     Osteoporosis     Paroxysmal atrial fibrillation (H)     Prediabetes     Routine general medical examination at a health care facility     Primary osteoarthritis of left knee     S/p left hip fracture     Parkinson's disease (H)     Device in situ     A-fib (H)     Paroxysmal A-fib (H)     Obesity (BMI 35.0-39.9) with comorbidity (H)        Allergies   Allergen Reactions     Lisinopril Cough     No Clinical Screening - See Comments      PN: LW FI1: food-nka     Past Surgical History:   Procedure Laterality Date     ARTHROSCOPY KNEE Right      ep cardioversion 2012, 2014, 2014 and 2015       ep studya/ablation 2015       EYE SURGERY Left     cataract left     HIP SURGERY Left 2018    fracture surgery     KNEE SURGERY Left     reconstruction     KNEE SURGERY Left 2017    replacement 2017     No past medical history on file.  Social History     Socioeconomic History     Marital status:      Spouse name: Not on file     Number of children: Not on file     Years of education: Not on file     Highest education level: Not on file   Occupational History     Not on file   Social Needs     Financial resource strain: Not on file     Food insecurity     Worry: Not on file     Inability: Not on file     Transportation needs     Medical: Not on file     Non-medical: Not on file   Tobacco Use     Smoking status: Former Smoker     Smokeless tobacco: Never Used     Tobacco comment: quit 1965   Substance and Sexual Activity     Alcohol use: Yes     Drug use: Not on file     Sexual activity: Not on file   Lifestyle     Physical activity     Days per week: Not on file     Minutes per session: Not on file     Stress: Not on file   Relationships     Social connections     Talks on phone: Not on file     Gets together: Not on file     Attends Zoroastrian service: Not on file     Active member of club or organization: Not on file     Attends meetings of clubs or  organizations: Not on file     Relationship status: Not on file     Intimate partner violence     Fear of current or ex partner: Not on file     Emotionally abused: Not on file     Physically abused: Not on file     Forced sexual activity: Not on file   Other Topics Concern     Not on file   Social History Narrative    LIVES IN [place].  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     Family History   Problem Relation Age of Onset     Cerebrovascular Disease Mother      Diabetes Mother      Emphysema Father      Heart Failure Brother      Dementia Sister      Cerebrovascular Disease Sister      Heart Disease Sister      Current Outpatient Medications   Medication Sig Dispense Refill     amoxicillin (AMOXIL) 500 MG capsule TAKE 4 CAPSULES BY MOUTH 1 HOUR PRIOR TO DENTAL APPOINTMENT       aspirin 81 MG EC tablet 81mg tab by mouth daily @ 8am       calcium carbonate 600 mg-vitamin D 400 units (CALTRATE) 600-400 MG-UNIT per tablet Calcium by mouth once daily @ 8/830pm       carbidopa-levodopa (SINEMET)  MG tablet 25/100 tab by mouth 3/day @7/8am, 11am-early afternoon, 3pm-7pm 270 tablet 3     cephALEXin (KEFLEX) 500 MG capsule TAKE 4 CAPSULES 1 HOUR PRIOR TO DENTAL PROCEDURE.       flecainide (TAMBOCOR) 100 MG tablet 100mg TABLET BY MOUTH EVERY 12 HOURS       losartan (COZAAR) 50 MG tablet 50mg tab by mouth twice daily @ 8am and 530pm 180 tablet 3     melatonin 5 MG tablet 5mg tab by mouth nightly @ 8pm       metoprolol succinate ER (TOPROL-XL) 25 MG 24 hr tablet 25mg tab by mouth twice daily       miconazole (MICATIN) 2 % external cream APPLY TO AFFECTED AREA TWICE A DAY       multivitamin (OCUVITE) TABS tablet Ocuvite tab by mouth daily @ 8am 90 tablet 3     QUEtiapine (SEROQUEL) 25 MG tablet May try to increase to 1 full tablet at bedtime (if unable to tolerate may take 1/2 tab or 3/4 tabs at bedtime) 90 tablet 3     rosuvastatin (CRESTOR) 5 MG tablet TAKE 1 TABLET BY MOUTH EVERYDAY AT BEDTIME  3

## 2020-12-07 DIAGNOSIS — G20.C PARKINSONISM, UNSPECIFIED PARKINSONISM TYPE (H): ICD-10-CM

## 2020-12-07 RX ORDER — CARBIDOPA AND LEVODOPA 25; 100 MG/1; MG/1
TABLET ORAL
Qty: 270 TABLET | Refills: 1 | Status: SHIPPED | OUTPATIENT
Start: 2020-12-07 | End: 2020-12-14

## 2020-12-07 NOTE — TELEPHONE ENCOUNTER
Rx Authorization:    Requested Medication/ Dose: Carbidopa-Levodopa  Tabs    Date last refill ordered: 2/25/19    Quantity ordered: 270 tabs    # refills: 3    Date of last clinic visit with ordering provider: 12/14/20    Date of next clinic visit with ordering provider: 12/14/20    All pertinent protocol data (lab date/result):     Include pertinent information from patients message:

## 2020-12-08 PROBLEM — N30.00 ACUTE CYSTITIS: Status: ACTIVE | Noted: 2020-06-17

## 2020-12-08 PROBLEM — I44.7 LBBB (LEFT BUNDLE BRANCH BLOCK): Status: ACTIVE | Noted: 2020-06-17

## 2020-12-08 PROBLEM — R00.1 SYMPTOMATIC BRADYCARDIA: Status: ACTIVE | Noted: 2020-06-20

## 2020-12-14 ENCOUNTER — VIRTUAL VISIT (OUTPATIENT)
Dept: NEUROLOGY | Facility: CLINIC | Age: 80
End: 2020-12-14
Payer: COMMERCIAL

## 2020-12-14 DIAGNOSIS — R41.89 COGNITIVE IMPAIRMENT: ICD-10-CM

## 2020-12-14 DIAGNOSIS — R44.3 HALLUCINATIONS: ICD-10-CM

## 2020-12-14 DIAGNOSIS — G20.A1 PARKINSON'S DISEASE (H): Primary | ICD-10-CM

## 2020-12-14 DIAGNOSIS — G20.C PARKINSONISM, UNSPECIFIED PARKINSONISM TYPE (H): ICD-10-CM

## 2020-12-14 PROCEDURE — 99214 OFFICE O/P EST MOD 30 MIN: CPT | Mod: 95 | Performed by: PSYCHIATRY & NEUROLOGY

## 2020-12-14 RX ORDER — RIVASTIGMINE 13.3 MG/24H
1 PATCH, EXTENDED RELEASE TRANSDERMAL DAILY
Qty: 30 PATCH | Refills: 11 | Status: SHIPPED | OUTPATIENT
Start: 2020-12-14 | End: 2021-03-22

## 2020-12-14 RX ORDER — RIVASTIGMINE 4.6 MG/24H
PATCH, EXTENDED RELEASE TRANSDERMAL
Qty: 30 PATCH | Refills: 11 | Status: SHIPPED | OUTPATIENT
Start: 2020-12-14 | End: 2021-03-22

## 2020-12-14 RX ORDER — QUETIAPINE FUMARATE 25 MG/1
TABLET, FILM COATED ORAL
Qty: 90 TABLET | Refills: 3 | COMMUNITY
Start: 2020-12-14 | End: 2021-03-25

## 2020-12-14 RX ORDER — KRILL/OM-3/DHA/EPA/PHOSPHO/AST 500MG-86MG
CAPSULE ORAL
COMMUNITY
End: 2021-03-22

## 2020-12-14 RX ORDER — RIVASTIGMINE 9.5 MG/24H
PATCH, EXTENDED RELEASE TRANSDERMAL
Qty: 30 PATCH | Refills: 11 | Status: SHIPPED | OUTPATIENT
Start: 2020-12-14 | End: 2021-03-22

## 2020-12-14 RX ORDER — CARBIDOPA AND LEVODOPA 25; 100 MG/1; MG/1
TABLET ORAL
Qty: 270 TABLET | Refills: 3 | Status: SHIPPED | OUTPATIENT
Start: 2020-12-14 | End: 2021-03-22

## 2020-12-14 NOTE — LETTER
12/14/2020         RE: Mervin Caraballo  [address]  [place] [address]        Dear Colleague,    Thank you for referring your patient, Mervin Caraballo, to the [facility] [address]. Please see a copy of my visit note below.        VIDEO VISIT    Date of Visit: December 14, 2020  Name: Mervin Caraballo  Date of Birth 1940  [place] [address]  [phone] (M)  [phone] (H)     [email or web address]     Has mychart     dfstaff     No proxy     Alice Caraballo  [phone] mobile    Assessment:  (G20) Parkinson's disease (H)  (primary encounter diagnosis)  He is on sinemet 1 tablet 3/day 25/100 @ 7/8am, 11am/early afternoon and 3-7pm  Not clear if he has wearing off  6am, 1030a/11a, 3p  Eats at 730/8/830am, noon and 430pm  No falls  He slipped; near falls    Had speech and physical therapy in home and nurse monitoring  No home schedule. They are not going to the gym. He was having knee problems and so stopped doing this.      He is imaging that he is hearing things and doors are closing  1/2 of seroquel 25mg at night    No recent UTI    Mood - a bit of mood problems. He has had some mood swings.   He may get mad at times.     Cognition - has confusion at times  He has not tried the rivastigmine patch     Lipid disorder  Rosuvastatin crestor 5mg at night  No diabetes or thyroid problem    Left knee arthritis    Sleep  Melatonin 5mg @ 8pm  He falls asleep and hears something and wakes up  Someone knocking at the door, dog barking or phone ringing.     LBBB  Junctional bradycardia  HTN - stable  Orthostatic hypotension    Not taking flecainide  Not taking metoprolol  His bp and heart rate are good  He is not having light headedness regularly  Dr. Carlyle Lehman  Midodrine did not help his orthostatic bp issue - not using  2 weeks of cardiac monitoring  Losartan cozaar 50mg 2/day   No need for pacemaker       Medications     7am  8am  11am    530pm 8/9pm   "  Amoxicillin amoxil 500mg Prior to  dental         Aspirin 81mg   1           Calcium carbonate 1500mg or 600mg           1    Carbidopa/levodopa Sinemet 25/100  1   1   1     Cephalexin keflex 500mg Prior to dental        Clotrimazole lotrimin 1% external cream               Krill oil 500mg           1    Losartan cozaar 50mg   1     1     Melatonin 5mg           1@8/9pm   MVI ocuvite   1           Quetiapine seroquel 25mg           1/2   Rivastigmine Exelon patch 24hr patch Not using              Rosuvastatin crestor 5mg           1   Tamsulosin flomax 0.4mg  not taking                                Plan:    Chair yoga or activities that are seated due to knee problems.     Increase the seroquel (Quetiapine) from 1/2 to 1 full 25mg tab at bedtime    Consultation with Amy Salgado pharmacist    Rivastigmine (exelon) patch sent to pharmacy but wait till seen by Amy Salgado    Discussed that nuplazid (pimavanserin) will require prior authorization and is used for hallucinations - dose is 34mg/day    Discussed that clozapine (clozaril) is another medication for hallucinations but would require blood monitoring.     Return in 3 months.    I have reviewed the note as documented above.  This accurately captures the substance of my conversation with the patient.    Patient contact time  25  minutes. Over 50% of this visit was spent in patient care and care coordination.     Time of visit 345pm - 415pm    Reji Arriaza MD      ------------------------------------------------------------------------------------------------------------------------------------------------------------------------    Video-Visit Details    The patient has been notified of following:     \"After a review of the patient s situation, this visit was changed from an in-person visit to a video visit to reduce the risk of COVID-19 exposure.   The patient is being evaluated via a billable video visit.\"    \"This video visit will be conducted via a call " "between you and your physician/provider. We have found that certain health care needs can be provided without the need for an in-person physical exam.  This service lets us provide the care you need with a video conversation.  If a prescription is necessary we can send it directly to your pharmacy.  If lab work is needed we can place an order for that and you can then stop by our lab to have the test done at a later time.    If during the course of the call the physician/provider feels a video visit is not appropriate, you will not be charged for this service.\"     Patient has given verbal consent for Video visit? Yes    Patient would like the video invitation sent by:     Type of service:  Video Visit    Video Start Time:     Video End Time (time video stopped):     Duration:  minutes - see above    Originating Location (pt. Location):     Distant Location (provider location):  [facility]     Mode of Communication:  Video Conference via [employer] (and if not possible then doximity)      Reji Arriaza MD      --------------------------------------------------------------------------------------------------------------    Mervin Caraballo is a 79 year old male who is being evaluated via a billable video visit.      Charts reviewed  Consult from  Images reviewed        I have reviewed and updated the patient's Past Medical History, Social History, Family History and Medication List.    ALLERGIES  Lisinopril and No clinical screening - see comments    Lasts visit details if there was a last visit:         Medications                                                                                                                                                                                                              14 Review of systems  are negative except for   Patient Active Problem List   Diagnosis     ACP (advance care planning)     Anemia     Anticoagulant long-term use     " Atrial fibrillation, currently in sinus rhythm     Bilateral lower extremity edema     Broken hip (H)     Cataract     Degenerative arthritis of lumbar spine     Encounter for monitoring Coumadin therapy     High blood pressure     HTN (hypertension)     Hyperlipidemia     Knee joint replacement status, left     MAXIME (obstructive sleep apnea)     Osteoporosis     Paroxysmal atrial fibrillation (H)     Prediabetes     Routine general medical examination at a health care facility     Primary osteoarthritis of left knee     S/p left hip fracture     Parkinson's disease (H)     Device in situ     A-fib (H)     Paroxysmal A-fib (H)     Obesity (BMI 35.0-39.9) with comorbidity (H)        Allergies   Allergen Reactions     Lisinopril Cough     No Clinical Screening - See Comments      PN: LW FI1: food-nka     Past Surgical History:   Procedure Laterality Date     ARTHROSCOPY KNEE Right      ep cardioversion 2012, 2014, 2014 and 2015       ep studya/ablation 2015       EYE SURGERY Left     cataract left     HIP SURGERY Left 2018    fracture surgery     KNEE SURGERY Left     reconstruction     KNEE SURGERY Left 2017    replacement 2017     No past medical history on file.  Social History     Socioeconomic History     Marital status:      Spouse name: Not on file     Number of children: Not on file     Years of education: Not on file     Highest education level: Not on file   Occupational History     Not on file   Social Needs     Financial resource strain: Not on file     Food insecurity     Worry: Not on file     Inability: Not on file     Transportation needs     Medical: Not on file     Non-medical: Not on file   Tobacco Use     Smoking status: Former Smoker     Smokeless tobacco: Never Used     Tobacco comment: quit 1965   Substance and Sexual Activity     Alcohol use: Yes     Drug use: Not on file     Sexual activity: Not on file   Lifestyle     Physical activity     Days per week: Not on file     Minutes per  session: Not on file     Stress: Not on file   Relationships     Social connections     Talks on phone: Not on file     Gets together: Not on file     Attends Evangelical service: Not on file     Active member of club or organization: Not on file     Attends meetings of clubs or organizations: Not on file     Relationship status: Not on file     Intimate partner violence     Fear of current or ex partner: Not on file     Emotionally abused: Not on file     Physically abused: Not on file     Forced sexual activity: Not on file   Other Topics Concern     Not on file   Social History Narrative    LIVES IN [place].  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     Family History   Problem Relation Age of Onset     Cerebrovascular Disease Mother      Diabetes Mother      Emphysema Father      Heart Failure Brother      Dementia Sister      Cerebrovascular Disease Sister      Heart Disease Sister      Current Outpatient Medications   Medication Sig Dispense Refill     amoxicillin (AMOXIL) 500 MG capsule TAKE 4 CAPSULES BY MOUTH 1 HOUR PRIOR TO DENTAL APPOINTMENT       aspirin 81 MG EC tablet 81mg tab by mouth daily @ 8am       calcium carbonate 600 mg-vitamin D 400 units (CALTRATE) 600-400 MG-UNIT per tablet Calcium by mouth once daily @ 8/830pm       carbidopa-levodopa (SINEMET)  MG tablet 25/100 tab by mouth 3/day @7/8am, 11am-early afternoon, 3pm-7pm 270 tablet 3     cephALEXin (KEFLEX) 500 MG capsule TAKE 4 CAPSULES 1 HOUR PRIOR TO DENTAL PROCEDURE.       flecainide (TAMBOCOR) 100 MG tablet 100mg TABLET BY MOUTH EVERY 12 HOURS       losartan (COZAAR) 50 MG tablet 50mg tab by mouth twice daily @ 8am and 530pm 180 tablet 3     melatonin 5 MG tablet 5mg tab by mouth nightly @ 8pm       metoprolol succinate ER (TOPROL-XL) 25 MG 24 hr tablet 25mg tab by mouth twice daily       miconazole (MICATIN) 2 % external cream APPLY TO AFFECTED AREA TWICE A DAY       multivitamin (OCUVITE) TABS tablet Ocuvite tab by mouth daily @ 8am 90 tablet 3      QUEtiapine (SEROQUEL) 25 MG tablet May try to increase to 1 full tablet at bedtime (if unable to tolerate may take 1/2 tab or 3/4 tabs at bedtime) 90 tablet 3     rosuvastatin (CRESTOR) 5 MG tablet TAKE 1 TABLET BY MOUTH EVERYDAY AT BEDTIME  3                         Again, thank you for allowing me to participate in the care of your patient.        Sincerely,        Reji Arriaza MD

## 2020-12-14 NOTE — PATIENT INSTRUCTIONS
(G20) Parkinson's disease (H)  (primary encounter diagnosis)  He is on sinemet 1 tablet 3/day 25/100 @ 7/8am, 11am/early afternoon and 3-7pm  Not clear if he has wearing off  6am, 1030a/11a, 3p  Eats at 730/8/830am, noon and 430pm  No falls  He slipped; near falls    Had speech and physical therapy in home and nurse monitoring  No home schedule. They are not going to the gym. He was having knee problems and so stopped doing this.      He is imaging that he is hearing things and doors are closing  1/2 of seroquel 25mg at night    No recent UTI    Mood - a bit of mood problems. He has had some mood swings.   He may get mad at times.     Cognition - has confusion at times  He has not tried the rivastigmine patch     Lipid disorder  Rosuvastatin crestor 5mg at night  No diabetes or thyroid problem    Left knee arthritis    Sleep  Melatonin 5mg @ 8pm  He falls asleep and hears something and wakes up  Someone knocking at the door, dog barking or phone ringing.     LBBB  Junctional bradycardia  HTN - stable  Orthostatic hypotension    Not taking flecainide  Not taking metoprolol  His bp and heart rate are good  He is not having light headedness regularly  Dr. Carlyle Lehman  Midodrine did not help his orthostatic bp issue - not using  2 weeks of cardiac monitoring  Losartan cozaar 50mg 2/day   No need for pacemaker       Medications     7am  8am  11am    530pm 8/9pm    Amoxicillin amoxil 500mg Prior to  dental         Aspirin 81mg   1           Calcium carbonate 1500mg or 600mg           1    Carbidopa/levodopa Sinemet 25/100  1   1   1     Cephalexin keflex 500mg Prior to dental        Clotrimazole lotrimin 1% external cream               Krill oil 500mg           1    Losartan cozaar 50mg   1     1     Melatonin 5mg           1@8/9pm   MVI ocuvite   1           Quetiapine seroquel 25mg           1/2   Rivastigmine Exelon patch 24hr patch Not using              Rosuvastatin crestor 5mg           1   Tamsulosin flomax  0.4mg  not taking                                Plan:    Chair yoga or activities that are seated due to knee problems.     Increase the seroquel (Quetiapine) from 1/2 to 1 full 25mg tab at bedtime    Consultation with Amy Salgado pharmacist    Rivastigmine (exelon) patch sent to pharmacy but wait till seen by Amy Salgado    Discussed that nuplazid (pimavanserin) will require prior authorization and is used for hallucinations - dose is 34mg/day    Discussed that clozapine (clozaril) is another medication for hallucinations but would require blood monitoring.     Return in 3 months.

## 2021-02-24 NOTE — PROGRESS NOTES
Diagnosis/Summary/Recommendations:    PATIENT: Mervin Caraballo  80 year old male     : 1940    [place]: 2021  [place] [address]  [phone] (M)  [phone] (H)     [email or web address]     Has mychart     dfstaff     No proxy     Alice Caraballo  [phone] mobile    Assessment:  G20) Parkinson's disease (H)  (primary encounter diagnosis)  Carbidopa/levodopa Sinemet 25/100  sinemet 1 tablet 3/day 25/100 @ 7/8am, 11am/early afternoon and 3-7pm  Not clear if he has wearing off  6am, 1030a/11a, 3p  Eats at 730/8/830am, noon and 430pm  No falls  He slipped; near falls     Had speech and physical therapy in home and nurse monitoring  No home schedule. They are not going to the gym. He was having knee problems and so stopped doing this.     He has some night time stiffness  He is slow moving      He is imaging that he is hearing things and doors are closing  Quetiapine seroquel 25mg at night     No recent UTI    Has had both vaccinated - pfizer   Wife was a bit tired  He has been sleeping all the time     Mood - a bit of mood problems. He has had some mood swings.   He may get mad at times.      Cognition - has confusion at times  Rivastigmine Exelon patch 24hr patch  - never started using    Calcium carbonate 1500mg or 600mg at night  Krill oil 500mg nightly   Lipid disorder  Rosuvastatin crestor 5mg at night  No diabetes or thyroid problem     Left knee arthritis     Sleep  Melatonin 5mg @ 8pm  He falls asleep and hears something and wakes up  Someone knocking at the door, dog barking or phone ringing.     Naps a lot during the day  9 or 930am sleep or napping or with eyes closed    Aspirin 81mg daily     LBBB  Junctional bradycardia  HTN - stable    Dr. Carlyle Lehman  Midodrine did not help his orthostatic bp issue - not using  2 weeks of cardiac monitoring    Amlodipine norvasc 2.5mg daily  Losartan cozaar 50mg 2/day     Blood pressure is better  No l ight headeness    No need  for pacemaker     Amoxicillin amoxil 500mg - prior to dental work   Cephalexin keflex 500mg - prior to dental work    Clotrimazole lotrimin 1% external cream  Miconazole micatain 2% external cream    Eyes  MVI ocuvite      Medications     730/8am  11am 230pm   530pm 730/8pm  During night   Amlodipine norvasc 2.5mg 1        Amoxicillin amoxil 500mg Prior to  dental             Aspirin 81mg 1             Calcium carbonate 1500mg or 600mg         1     Carbidopa/levodopa Sinemet 25/100  1   1  1    Cephalexin keflex 500mg Prior to dental            Clotrimazole lotrimin 1% external cream              Krill oil 500mg 1            Losartan cozaar 50mg  1      1     Melatonin 5mg         1    Miconazole micatin 2% external cream         MVI ocuvite  1            Quetiapine seroquel 25mg         1    Rivastigmine Exelon patch 24hr patch Will try            Rosuvastatin crestor 5mg         1    Tamsulosin flomax 0.4mg  not taking                                Plan:    Will try the rivastigmine/exelon patch   Rx sent to pharmacy   116/67 and pulse 79 on 3/26/2021  Will have family monitor for benefit and side effects  They will set up a visit with Amy Salgado, Pharm D to review the outcome of this medication in the coming weeks    He has some huffing and puffing when walking and has to sit down - wife wonder if related to atrial fibrillation and may be deconditioned due to covid19 isolation.  May want to talk with PCP or/and cardiology about this.  Suspect that this is possibly parkinson vs heart but would defer to others.     He has spells where he is weak and has to lay down - not clear if this is related to blood pressure.     Will see if his seroquel dose needs to be increased to sustain his sleep but may increase his sleepiness and fall risk  He is able to fall asleep and wakes up at 1130pm and 4/430am and has to urinate    They may go off the melatonin to see if it is helping.     Will also need review of timing of  sinemet and if he needs a 4th dose during the night.     Return to see me in 3 months    Consultation with Amy Salgado, PHarm D      Coding statement:   Medical Decision Making:  #  Chronic progressive medical conditions addressed  Cognitive/movement/other  Review and/or interpretation of unique test or documentation from a provider outside of neurology no   Independent historian provided additional details  Yes - wife and daughter  Prescription drug management and review of potential side effects and/or monitoring for side effects  yes   Health impacted by social determinants of health  Yes - cognitive chanes    I have reviewed the note as documented above.  This accurately captures the substance of my conversation with the patient and total time spent preparing for visit, executing visit and completing visit on the day of the visit:  40 minutes.      Reji Arriaza MD     ______________________________________    Last visit date and details:             ______________________________________      Patient was asked about 14 Review of systems including changes in vision (dry eyes, double vision), hearing, heart, lungs, musculoskeletal, depression, anxiety, snoring, RBD, insomnia, urinary frequency, urinary urgency, constipation, swallowing problems, hematological, ID, allergies, skin problems: seborrhea, endocrinological: thyroid, diabetes, cholesterol; balance, weight changes, and other neurological problems and these were not significant at this time except for   Patient Active Problem List   Diagnosis     ACP (advance care planning)     Anemia     Anticoagulant long-term use     Atrial fibrillation, currently in sinus rhythm     Bilateral lower extremity edema     Broken hip (H)     Cataract     Degenerative arthritis of lumbar spine     Encounter for monitoring Coumadin therapy     High blood pressure     HTN (hypertension)     Hyperlipidemia     Knee joint replacement status, left     MAXIME (obstructive sleep apnea)      Osteoporosis     Paroxysmal atrial fibrillation (H)     Prediabetes     Routine general medical examination at a health care facility     Primary osteoarthritis of left knee     S/p left hip fracture     Parkinson's disease (H)     Device in situ     A-fib (H)     Paroxysmal A-fib (H)     Obesity (BMI 35.0-39.9) with comorbidity (H)     Acute cystitis     LBBB (left bundle branch block)     Symptomatic bradycardia          Allergies   Allergen Reactions     Lisinopril Cough     No Clinical Screening - See Comments      PN: LW FI1: food-nka     Past Surgical History:   Procedure Laterality Date     ARTHROSCOPY KNEE Right      ep cardioversion 2012, 2014, 2014 and 2015       ep studya/ablation 2015       EYE SURGERY Left     cataract left     HIP SURGERY Left 2018    fracture surgery     KNEE SURGERY Left     reconstruction     KNEE SURGERY Left 2017    replacement 2017     No past medical history on file.  Social History     Socioeconomic History     Marital status:      Spouse name: Not on file     Number of children: Not on file     Years of education: Not on file     Highest education level: Not on file   Occupational History     Not on file   Social Needs     Financial resource strain: Not on file     Food insecurity     Worry: Not on file     Inability: Not on file     Transportation needs     Medical: Not on file     Non-medical: Not on file   Tobacco Use     Smoking status: Former Smoker     Smokeless tobacco: Never Used     Tobacco comment: quit 1965   Substance and Sexual Activity     Alcohol use: Yes     Drug use: Not on file     Sexual activity: Not on file   Lifestyle     Physical activity     Days per week: Not on file     Minutes per session: Not on file     Stress: Not on file   Relationships     Social connections     Talks on phone: Not on file     Gets together: Not on file     Attends Christianity service: Not on file     Active member of club or organization: Not on file     Attends  meetings of clubs or organizations: Not on file     Relationship status: Not on file     Intimate partner violence     Fear of current or ex partner: Not on file     Emotionally abused: Not on file     Physically abused: Not on file     Forced sexual activity: Not on file   Other Topics Concern     Not on file   Social History Narrative    LIVES IN [place].  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  Medications and Vitals not listed above were documented in the cart and reviewed by me.     Current Outpatient Medications   Medication Sig Dispense Refill     amoxicillin (AMOXIL) 500 MG capsule TAKE 4 CAPSULES BY MOUTH 1 HOUR PRIOR TO DENTAL APPOINTMENT       aspirin 81 MG EC tablet 81mg tab by mouth daily @ 8am       calcium carbonate 600 mg-vitamin D 400 units (CALTRATE) 600-400 MG-UNIT per tablet Calcium by mouth once daily @ 8/830pm       carbidopa-levodopa (SINEMET)  MG tablet 1 TABLET BY MOUTH THREE TIMES A DAY @7am, 11am and 3pm 270 tablet 3     cephALEXin (KEFLEX) 500 MG capsule TAKE 4 CAPSULES 1 HOUR PRIOR TO DENTAL PROCEDURE.       Krill Oil 500 MG CAPS 500mg capsule by mouth nightly       losartan (COZAAR) 50 MG tablet 50mg tab by mouth twice daily @ 8am and 530pm 180 tablet 3     melatonin 5 MG tablet 5mg tab by mouth nightly @ 8pm       miconazole (MICATIN) 2 % external cream APPLY TO AFFECTED AREA TWICE A DAY       multivitamin (OCUVITE) TABS tablet Ocuvite tab by mouth daily @ 8am 90 tablet 3     QUEtiapine (SEROQUEL) 25 MG tablet 25mg tab by mouth every evening 90 tablet 3     rivastigmine (EXELON) 13.3 MG/24HR 24 hr patch Place 1 patch onto the skin daily 4.6 mg patch daily x 1month  then 9.5mg patch daily x 1 month then 13.3mg patch daily 30 patch 11     rivastigmine (EXELON) 4.6 MG/24HR 24 hr patch 4.6 mg patch daily x 1month then 9.5mg patch daily x 1 month then 13.3mg patch daily 30 patch 11     rivastigmine (EXELON) 9.5 MG/24HR 24 hr patch 4.6 mg patch daily x 1month then 9.5mg patch daily x 1 month then 13.3mg patch daily 30 patch 11     rosuvastatin (CRESTOR) 5 MG tablet TAKE 1 TABLET BY MOUTH EVERYDAY AT BEDTIME  3         Medications                                                                                                                                                  Reji Arriaza MD

## 2021-03-17 ENCOUNTER — TELEPHONE (OUTPATIENT)
Dept: NEUROLOGY | Facility: CLINIC | Age: 81
End: 2021-03-17

## 2021-03-17 NOTE — TELEPHONE ENCOUNTER
I called patient to verify that pt was aware that his appt with Dr. Arriaza on 03/22/2021 is a virtual appt. I spoke with Wife and they were not aware.  I rescheduled to video appt.  Wife stated that they have [employer] but they have a special number to give to us for authorization.  I transferred to scheduling and they will direct pt wife on how to proceed.    Anika Lange LPN

## 2021-03-21 RX ORDER — AMLODIPINE BESYLATE 2.5 MG/1
2.5 TABLET ORAL DAILY
COMMUNITY
Start: 2021-02-28

## 2021-03-22 ENCOUNTER — VIRTUAL VISIT (OUTPATIENT)
Dept: NEUROLOGY | Facility: CLINIC | Age: 81
End: 2021-03-22
Payer: COMMERCIAL

## 2021-03-22 DIAGNOSIS — R44.3 HALLUCINATIONS: ICD-10-CM

## 2021-03-22 DIAGNOSIS — G20.A1 PARKINSON'S DISEASE (H): Primary | ICD-10-CM

## 2021-03-22 DIAGNOSIS — G20.C PARKINSONISM, UNSPECIFIED PARKINSONISM TYPE (H): ICD-10-CM

## 2021-03-22 PROCEDURE — 99215 OFFICE O/P EST HI 40 MIN: CPT | Mod: 95 | Performed by: PSYCHIATRY & NEUROLOGY

## 2021-03-22 RX ORDER — RIVASTIGMINE 9.5 MG/24H
PATCH, EXTENDED RELEASE TRANSDERMAL
Qty: 30 PATCH | Refills: 11 | Status: SHIPPED | OUTPATIENT
Start: 2021-03-22

## 2021-03-22 RX ORDER — LOSARTAN POTASSIUM 50 MG/1
TABLET ORAL
Qty: 180 TABLET | Refills: 3 | COMMUNITY
Start: 2021-03-22

## 2021-03-22 RX ORDER — KRILL/OM-3/DHA/EPA/PHOSPHO/AST 500MG-86MG
CAPSULE ORAL
COMMUNITY
Start: 2021-03-22

## 2021-03-22 RX ORDER — RIVASTIGMINE 13.3 MG/24H
1 PATCH, EXTENDED RELEASE TRANSDERMAL DAILY
Qty: 30 PATCH | Refills: 11 | Status: SHIPPED | OUTPATIENT
Start: 2021-03-22

## 2021-03-22 RX ORDER — RIVASTIGMINE 4.6 MG/24H
PATCH, EXTENDED RELEASE TRANSDERMAL
Qty: 30 PATCH | Refills: 11 | Status: SHIPPED | OUTPATIENT
Start: 2021-03-22

## 2021-03-22 RX ORDER — CARBIDOPA AND LEVODOPA 25; 100 MG/1; MG/1
TABLET ORAL
Qty: 270 TABLET | Refills: 3 | COMMUNITY
Start: 2021-03-22

## 2021-03-22 NOTE — PATIENT INSTRUCTIONS
Assessment:  G20) Parkinson's disease (H)  (primary encounter diagnosis)  Carbidopa/levodopa Sinemet 25/100  sinemet 1 tablet 3/day 25/100 @ 7/8am, 11am/early afternoon and 3-7pm  Not clear if he has wearing off  6am, 1030a/11a, 3p  Eats at 730/8/830am, noon and 430pm  No falls  He slipped; near falls     Had speech and physical therapy in home and nurse monitoring  No home schedule. They are not going to the gym. He was having knee problems and so stopped doing this.     He has some night time stiffness  He is slow moving      He is imaging that he is hearing things and doors are closing  Quetiapine seroquel 25mg at night     No recent UTI    Has had both vaccinated - pfizer   Wife was a bit tired  He has been sleeping all the time     Mood - a bit of mood problems. He has had some mood swings.   He may get mad at times.      Cognition - has confusion at times  Rivastigmine Exelon patch 24hr patch  - never started using    Calcium carbonate 1500mg or 600mg at night  Krill oil 500mg nightly   Lipid disorder  Rosuvastatin crestor 5mg at night  No diabetes or thyroid problem     Left knee arthritis     Sleep  Melatonin 5mg @ 8pm  He falls asleep and hears something and wakes up  Someone knocking at the door, dog barking or phone ringing.     Naps a lot during the day  9 or 930am sleep or napping or with eyes closed    Aspirin 81mg daily     LBBB  Junctional bradycardia  HTN - stable    Dr. Carlyle Lehman  Midodrine did not help his orthostatic bp issue - not using  2 weeks of cardiac monitoring    Amlodipine norvasc 2.5mg daily  Losartan cozaar 50mg 2/day     Blood pressure is better  No l ight headeness    No need for pacemaker     Amoxicillin amoxil 500mg - prior to dental work   Cephalexin keflex 500mg - prior to dental work    Clotrimazole lotrimin 1% external cream  Miconazole micatain 2% external cream    Eyes  MVI ocuvite      Medications     730/8am  11am 230pm   530pm 730/8pm  During night   Amlodipine  norvasc 2.5mg 1        Amoxicillin amoxil 500mg Prior to  dental             Aspirin 81mg 1             Calcium carbonate 1500mg or 600mg         1     Carbidopa/levodopa Sinemet 25/100  1   1  1    Cephalexin keflex 500mg Prior to dental            Clotrimazole lotrimin 1% external cream              Krill oil 500mg 1            Losartan cozaar 50mg  1      1     Melatonin 5mg         1    Miconazole micatin 2% external cream         MVI ocuvite  1            Quetiapine seroquel 25mg         1    Rivastigmine Exelon patch 24hr patch Will try            Rosuvastatin crestor 5mg         1    Tamsulosin flomax 0.4mg  not taking                                Plan:    Will try the rivastigmine/exelon patch   Rx sent to pharmacy   116/67 and pulse 79 on 3/26/2021  Will have family monitor for benefit and side effects  They will set up a visit with Amy Salgado, Pharm D to review the outcome of this medication in the coming weeks    He has some huffing and puffing when walking and has to sit down - wife wonder if related to atrial fibrillation and may be deconditioned due to covid19 isolation.  May want to talk with PCP or/and cardiology about this.  Suspect that this is possibly parkinson vs heart but would defer to others.     He has spells where he is weak and has to lay down - not clear if this is related to blood pressure.     Will see if his seroquel dose needs to be increased to sustain his sleep but may increase his sleepiness and fall risk  He is able to fall asleep and wakes up at 1130pm and 4/430am and has to urinate    They may go off the melatonin to see if it is helping.     Will also need review of timing of sinemet and if he needs a 4th dose during the night.     Return to see me in 3 months    Consultation with Amy Salgado, PHarm D

## 2021-03-22 NOTE — LETTER
3/22/2021         RE: Mervin Caraballo   [address]  [place] [address]        Dear Colleague,    Thank you for referring your patient, Mervin Caraballo, to the [facility] [place]. Please see a copy of my visit note below.        Diagnosis/Summary/Recommendations:    PATIENT: Mervin Caraballo  80 year old male     : 1940    DEMARCO: 2021  [place] [address]  [phone] (M)  [phone] (H)     [email or web address]     Has mychart     [email or web address]     No proxy     Alice Caraballo  [phone] mobile    Assessment:  G20) Parkinson's disease (H)  (primary encounter diagnosis)  Carbidopa/levodopa Sinemet 25/100  sinemet 1 tablet 3/day 25/100 @ 7/8am, 11am/early afternoon and 3-7pm  Not clear if he has wearing off  6am, 1030a/11a, 3p  Eats at 730/8/830am, noon and 430pm  No falls  He slipped; near falls     Had speech and physical therapy in home and nurse monitoring  No home schedule. They are not going to the gym. He was having knee problems and so stopped doing this.     He has some night time stiffness  He is slow moving      He is imaging that he is hearing things and doors are closing  Quetiapine seroquel 25mg at night     No recent UTI    Has had both vaccinated - pfizer   Wife was a bit tired  He has been sleeping all the time     Mood - a bit of mood problems. He has had some mood swings.   He may get mad at times.      Cognition - has confusion at times  Rivastigmine Exelon patch 24hr patch  - never started using    Calcium carbonate 1500mg or 600mg at night  Krill oil 500mg nightly   Lipid disorder  Rosuvastatin crestor 5mg at night  No diabetes or thyroid problem     Left knee arthritis     Sleep  Melatonin 5mg @ 8pm  He falls asleep and hears something and wakes up  Someone knocking at the door, dog barking or phone ringing.     Naps a lot during the day  9 or 930am sleep or napping or with eyes closed    Aspirin 81mg  daily     LBBB  Junctional bradycardia  HTN - stable    Dr. Carlyle Lehman  Midodrine did not help his orthostatic bp issue - not using  2 weeks of cardiac monitoring    Amlodipine norvasc 2.5mg daily  Losartan cozaar 50mg 2/day     Blood pressure is better  No l ight headeness    No need for pacemaker     Amoxicillin amoxil 500mg - prior to dental work   Cephalexin keflex 500mg - prior to dental work    Clotrimazole lotrimin 1% external cream  Miconazole micatain 2% external cream    Eyes  MVI ocuvite      Medications     730/8am  11am 230pm   530pm 730/8pm  During night   Amlodipine norvasc 2.5mg 1        Amoxicillin amoxil 500mg Prior to  dental             Aspirin 81mg 1             Calcium carbonate 1500mg or 600mg         1     Carbidopa/levodopa Sinemet 25/100  1   1  1    Cephalexin keflex 500mg Prior to dental            Clotrimazole lotrimin 1% external cream              Krill oil 500mg 1            Losartan cozaar 50mg  1      1     Melatonin 5mg         1    Miconazole micatin 2% external cream         MVI ocuvite  1            Quetiapine seroquel 25mg         1    Rivastigmine Exelon patch 24hr patch Will try            Rosuvastatin crestor 5mg         1    Tamsulosin flomax 0.4mg  not taking                                Plan:    Will try the rivastigmine/exelon patch   Rx sent to pharmacy   116/67 and pulse 79 on 3/26/2021  Will have family monitor for benefit and side effects  They will set up a visit with Amy Salgado, Pharm D to review the outcome of this medication in the coming weeks    He has some huffing and puffing when walking and has to sit down - wife wonder if related to atrial fibrillation and may be deconditioned due to covid19 isolation.  May want to talk with PCP or/and cardiology about this.  Suspect that this is possibly parkinson vs heart but would defer to others.     He has spells where he is weak and has to lay down - not clear if this is related to blood pressure.     Will see  if his seroquel dose needs to be increased to sustain his sleep but may increase his sleepiness and fall risk  He is able to fall asleep and wakes up at 1130pm and 4/430am and has to urinate    They may go off the melatonin to see if it is helping.     Will also need review of timing of sinemet and if he needs a 4th dose during the night.     Return to see me in 3 months    Consultation with Amy Salgado, PHarm D      Coding statement:   Medical Decision Making:  #  Chronic progressive medical conditions addressed  Cognitive/movement/other  Review and/or interpretation of unique test or documentation from a provider outside of neurology no   Independent historian provided additional details  Yes - wife and daughter  Prescription drug management and review of potential side effects and/or monitoring for side effects  yes   Health impacted by social determinants of health  Yes - cognitive chanes    I have reviewed the note as documented above.  This accurately captures the substance of my conversation with the patient and total time spent preparing for visit, executing visit and completing visit on the day of the visit:  40 minutes.      Reji Arriaza MD     ______________________________________    Last visit date and details:             ______________________________________      Patient was asked about 14 Review of systems including changes in vision (dry eyes, double vision), hearing, heart, lungs, musculoskeletal, depression, anxiety, snoring, RBD, insomnia, urinary frequency, urinary urgency, constipation, swallowing problems, hematological, ID, allergies, skin problems: seborrhea, endocrinological: thyroid, diabetes, cholesterol; balance, weight changes, and other neurological problems and these were not significant at this time except for   Patient Active Problem List   Diagnosis     ACP (advance care planning)     Anemia     Anticoagulant long-term use     Atrial fibrillation, currently in sinus rhythm      Bilateral lower extremity edema     Broken hip (H)     Cataract     Degenerative arthritis of lumbar spine     Encounter for monitoring Coumadin therapy     High blood pressure     HTN (hypertension)     Hyperlipidemia     Knee joint replacement status, left     MAXIME (obstructive sleep apnea)     Osteoporosis     Paroxysmal atrial fibrillation (H)     Prediabetes     Routine general medical examination at a health care facility     Primary osteoarthritis of left knee     S/p left hip fracture     Parkinson's disease (H)     Device in situ     A-fib (H)     Paroxysmal A-fib (H)     Obesity (BMI 35.0-39.9) with comorbidity (H)     Acute cystitis     LBBB (left bundle branch block)     Symptomatic bradycardia          Allergies   Allergen Reactions     Lisinopril Cough     No Clinical Screening - See Comments      PN: LW FI1: food-nka     Past Surgical History:   Procedure Laterality Date     ARTHROSCOPY KNEE Right      ep cardioversion 2012, 2014, 2014 and 2015       ep studya/ablation 2015       EYE SURGERY Left     cataract left     HIP SURGERY Left 2018    fracture surgery     KNEE SURGERY Left     reconstruction     KNEE SURGERY Left 2017    replacement 2017     No past medical history on file.  Social History     Socioeconomic History     Marital status:      Spouse name: Not on file     Number of children: Not on file     Years of education: Not on file     Highest education level: Not on file   Occupational History     Not on file   Social Needs     Financial resource strain: Not on file     Food insecurity     Worry: Not on file     Inability: Not on file     Transportation needs     Medical: Not on file     Non-medical: Not on file   Tobacco Use     Smoking status: Former Smoker     Smokeless tobacco: Never Used     Tobacco comment: quit 1965   Substance and Sexual Activity     Alcohol use: Yes     Drug use: Not on file     Sexual activity: Not on file   Lifestyle     Physical activity     Days per  week: Not on file     Minutes per session: Not on file     Stress: Not on file   Relationships     Social connections     Talks on phone: Not on file     Gets together: Not on file     Attends Faith service: Not on file     Active member of club or organization: Not on file     Attends meetings of clubs or organizations: Not on file     Relationship status: Not on file     Intimate partner violence     Fear of current or ex partner: Not on file     Emotionally abused: Not on file     Physically abused: Not on file     Forced sexual activity: Not on file   Other Topics Concern     Not on file   Social History Narrative    LIVES IN [place].  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  Medications and Vitals not listed above were documented in the cart and reviewed by me.     Current Outpatient Medications   Medication Sig Dispense Refill     amoxicillin (AMOXIL) 500 MG capsule TAKE 4 CAPSULES BY MOUTH 1 HOUR PRIOR TO DENTAL APPOINTMENT       aspirin 81 MG EC tablet 81mg tab by mouth daily @ 8am       calcium carbonate 600 mg-vitamin D 400 units (CALTRATE) 600-400 MG-UNIT per tablet Calcium by mouth once daily @ 8/830pm       carbidopa-levodopa (SINEMET)  MG tablet 1 TABLET BY MOUTH THREE TIMES A DAY @7am, 11am and 3pm 270 tablet 3     cephALEXin (KEFLEX) 500 MG capsule TAKE 4 CAPSULES 1 HOUR PRIOR TO DENTAL PROCEDURE.       Krill Oil 500 MG CAPS 500mg capsule by mouth nightly       losartan (COZAAR) 50 MG tablet 50mg tab by mouth twice daily @ 8am and 530pm 180 tablet 3     melatonin 5 MG tablet 5mg tab by mouth nightly @ 8pm       miconazole (MICATIN) 2 % external cream APPLY TO AFFECTED AREA  TWICE A DAY       multivitamin (OCUVITE) TABS tablet Ocuvite tab by mouth daily @ 8am 90 tablet 3     QUEtiapine (SEROQUEL) 25 MG tablet 25mg tab by mouth every evening 90 tablet 3     rivastigmine (EXELON) 13.3 MG/24HR 24 hr patch Place 1 patch onto the skin daily 4.6 mg patch daily x 1month then 9.5mg patch daily x 1 month then 13.3mg patch daily 30 patch 11     rivastigmine (EXELON) 4.6 MG/24HR 24 hr patch 4.6 mg patch daily x 1month then 9.5mg patch daily x 1 month then 13.3mg patch daily 30 patch 11     rivastigmine (EXELON) 9.5 MG/24HR 24 hr patch 4.6 mg patch daily x 1month then 9.5mg patch daily x 1 month then 13.3mg patch daily 30 patch 11     rosuvastatin (CRESTOR) 5 MG tablet TAKE 1 TABLET BY MOUTH EVERYDAY AT BEDTIME  3         Medications                                                                                                                                                  Reji Arriaza MD        Again, thank you for allowing me to participate in the care of your patient.        Sincerely,        Reji Arriaza MD

## 2021-03-25 DIAGNOSIS — G20.C PARKINSONISM, UNSPECIFIED PARKINSONISM TYPE (H): ICD-10-CM

## 2021-03-25 RX ORDER — QUETIAPINE FUMARATE 25 MG/1
TABLET, FILM COATED ORAL
Qty: 90 TABLET | Refills: 3 | Status: SHIPPED | OUTPATIENT
Start: 2021-03-25

## 2021-03-25 NOTE — TELEPHONE ENCOUNTER
Rx Authorization:    Requested Medication/ Dose QUETIAPINE FUMARATE 25 MG TAB    Date last refill ordered: 12/14/2020    Quantity ordered: 90 tabs    # refills: 3    Date of last clinic visit with ordering provider: 3/22/2021    Date of next clinic visit with ordering provider: 6/21/2021    All pertinent protocol data (lab date/result):     Include pertinent information from patients message:

## 2021-03-25 NOTE — TELEPHONE ENCOUNTER
RN unable to sign RX per refill protocol. Encounter routed to Dr Arriaza to review and sign.  Jessie Truong RNCC  Neurology

## 2021-03-29 DIAGNOSIS — G20.C PARKINSONISM, UNSPECIFIED PARKINSONISM TYPE (H): ICD-10-CM

## 2021-03-29 NOTE — TELEPHONE ENCOUNTER
Prior Authorization Retail Medication Request    Medication/Dose: Quetiapine Fumarate 25mg, Take 1/2 tab by mouth nightly for 1 week and may increase to 1 tablet nightly   ICD code (if different than what is on RX):    Previously Tried and Failed: pt has been taking since 2019   Rationale:  Pt has been taking this since 2019 for sleep and irritability.     Insurance Name: [employer]  Insurance ID: [id number]       Pharmacy Information (if different than what is on RX)  Name:  [facility] in [place]  Phone: [phone]

## 2021-03-30 RX ORDER — QUETIAPINE FUMARATE 25 MG/1
TABLET, FILM COATED ORAL
Qty: 90 TABLET | Refills: 3 | Status: CANCELLED | OUTPATIENT
Start: 2021-03-30

## 2021-03-30 NOTE — TELEPHONE ENCOUNTER
PA Initiation    Medication: QUEtiapine (SEROQUEL) 25 MG tablet   Insurance Company: [employer] - Phone [phone] Fax [phone]  Pharmacy Filling the Rx: [id number] [place] - [address] [place] [address] AT [facility]  Filling Pharmacy Phone: [phone]  Filling Pharmacy Fax: [phone]  Start Date: 3/30/2021

## 2021-03-30 NOTE — TELEPHONE ENCOUNTER
Prior Authorization Approval    Authorization Effective Date: 3/30/2021  Authorization Expiration Date: 12/31/2021  Medication: QUEtiapine (SEROQUEL) 25 MG tablet--APPROVED  Approved Dose/Quantity:   Reference #:     Insurance Company: [employer] [phone] Fax [phone]  Expected CoPay:       CoPay Card Available:      Foundation Assistance Needed:    Which Pharmacy is filling the prescription (Not needed for infusion/clinic administered): [id number] [place] - [address] [place] [address]  Pharmacy Notified: Yes  Patient Notified: Yes **Instructed pharmacy to notify patient when script is ready to /ship.**

## 2021-04-09 ENCOUNTER — VIRTUAL VISIT (OUTPATIENT)
Dept: PHARMACY | Facility: CLINIC | Age: 81
End: 2021-04-09

## 2021-04-09 DIAGNOSIS — R44.3 HALLUCINATIONS: ICD-10-CM

## 2021-04-09 DIAGNOSIS — G20.A1 PARKINSON'S DISEASE (H): ICD-10-CM

## 2021-04-09 DIAGNOSIS — I48.0 PAROXYSMAL ATRIAL FIBRILLATION (H): ICD-10-CM

## 2021-04-09 DIAGNOSIS — G47.00 INSOMNIA, UNSPECIFIED TYPE: ICD-10-CM

## 2021-04-09 DIAGNOSIS — I10 ESSENTIAL HYPERTENSION: ICD-10-CM

## 2021-04-09 DIAGNOSIS — N39.0 URINARY TRACT INFECTION WITHOUT HEMATURIA, SITE UNSPECIFIED: ICD-10-CM

## 2021-04-09 DIAGNOSIS — R41.89 COGNITIVE IMPAIRMENT: Primary | ICD-10-CM

## 2021-04-09 PROCEDURE — 99605 MTMS BY PHARM NP 15 MIN: CPT | Mod: TEL | Performed by: PHARMACIST

## 2021-04-09 PROCEDURE — 99607 MTMS BY PHARM ADDL 15 MIN: CPT | Mod: TEL | Performed by: PHARMACIST

## 2021-04-09 RX ORDER — NITROFURANTOIN MACROCRYSTAL 100 MG
100 CAPSULE ORAL 2 TIMES DAILY
COMMUNITY

## 2021-04-09 NOTE — PROGRESS NOTES
Medication Therapy Management (MTM) Encounter    ASSESSMENT:                            Medication Adherence/Access: No issues identified    Cognitive impairment/Hallucination: Rivastigmine is an acetylcholinesterase inhibitor that prevents the breaking down of acetylcholine, a fundamental neurotransmitter for cognitive function. This medication may help with anger management, hallucinations, and maintain train of thought/cognition/memory. Seroquel can also help with hallucinations as well as sleep and we could consider increasing the dose of Seroquel in the future if needed. Patient is on a titration regimen for rivastigmine patch monthly, no further change is necessary at this moment. We also discussed the patient's extra sleepiness in the evenings, which could be related to the timing of Seroquel.     Parkinson Disease: appears stable; it is unclear what is causing the patient's weakness at this time - progression of Parkinson's disease, atrial fibrillation, low blood pressure, or current UTI.     Insomnia: discussed that melatonin could be re-started to help regulate the patient's circadian rhythm.    Afib/HTN: Stable    UTI: Patient may feel better in terms of energy level/weakness after UTI is treated.    PLAN:                            1. Please continue rivastigmine 4.6 mg patch daily for 1 month then increase to 9.5 mg patch daily for 1 month, then increase to 13.3 mg patch daily. Follow up with Dr. Arriaza for ongoing management of Parkinson's disease but reach out to me with any questions via [employer].  2. Please continue to monitor blood pressure at home. You do not have to do this daily because Mervin's blood pressure seems to be good.   3. Complete the nitrofurantoin 100 mg twice daily for 7 days course. We are hopeful that Mervin will feel better, less weak, after the UTI is treated.   4. Please re-start melatonin at about 7 pm (at least an hour before bedtime) to help with sleep schedule. It may be  "better to give Mervin the Seroquel later in the evening (ie: 8 pm) so that he doesn't get too tired taking the medication earlier in the evening.  5. In the future, we may consider increasing Seroquel to help with Mervin's sleep and hallucinations.      Follow-up: as needed    SUBJECTIVE/OBJECTIVE:                          Mervin Caraballo is a 80 year old male called for a follow-up visit. He was referred to me from Dr. Arriaza. Today's visit is a follow-up MTM visit from 1/22/20. The majority of the visit was conducted with the patient's wife, Albina, as the patient has cognitive impairment associated with Parkinson's disease. The patient was interviewed briefly but had no concerns to report.     Reason for visit: Medication follow-up with following concerns:  1. What is the rivastigmine patch for?  2. Want to know if there is any option to manage anger and improve flow of thought?   3. Wonder when will his feeling of weakness improve?     Allergies/ADRs: Reviewed in chart  Tobacco: He reports that he has quit smoking. He has never used smokeless tobacco.  Alcohol: 1-3 beverages / week  Past Medical History: Reviewed in chart    Medication Adherence/Access: no issues reported    Cognitive impairment/Hallucination: Patient is using the rivastigmine 4.6 mg patch and Seroquel 25 mg daily at bedtime. Reports the patch has been helpful with hallucinations so far, but wife admits it has not been very long since the patch was started (about 2 weeks). Patient is experiencing less hallucination episodes per wife. Reports that patient sometimes has become angry during conversations and often loses his train of thought.     Parkinson Disease: Patient is taking carbidopa-levodopa  mg 1 tablet three times daily, at 7-730 AM, at 2-230 PM and at 7-730 PM. Reports feeling very weak and sleepy at around 7-730 at night. Sometimes can stay up until 8 PM. Patient reports feeling \"weak and worthless.\" Patient/wife have not found " an association between episodes of weakness and low blood pressure.    Insomnia: Patient was taking melatonin 5 mg daily right before bedtime but recently they stopped melatonin as they weren't sure it was helping. Wife states that she is not sure the melatonin has been helpful as he tends to have trouble sleeping between 8-10 pm but then when she goes to bed in the neighboring room he seems to fall asleep (she wonders if he is comforted by her going to sleep). Wife asks if they should re-start melatonin.     Afib/HTN: Patient is taking amlodipine 2.5 mg daily, losartan 50 mg twice daily, aspirin 81 mg daily. Reported morning or noon/early evening blood pressure/pulse in the past week: 3/23 128/79, 141/74 (77), 3/28 127/80 (90), 4/4 127/75 (75), 4/6 138/82 (84), 4/8 130/78. Patient does not endorse any side effects of these medications. He has not seen his cardiologist recently but wife stated she may schedule an appointment to meet with cardiology because she is concerned that his Afib may be contributing to his feeling of weakness.  BP Readings from Last 3 Encounters:   01/13/20 131/78   09/16/19 (!) 146/83   06/17/19 131/78     UTI: Patient is taking nitrofurantoin 100 mg twice daily for 7 days started on 4/8/21. Patient does not report any side effects. Wife stated that she had noticed that his urine had a foul smell and was cloudy, which prompted her to take him into the clinic for a urine test. She recalls that he had a UTI about a year ago when he was in the hospital. With both UTIs the patient did not report any change in urinary frequency or dysuria.    Today's Vitals: There were no vitals taken for this visit.  ----------------    I spent 28 minutes with this patient today. All changes were made via collaborative practice agreement with Dr. Arriaza. A copy of the visit note was provided to the patient's referring provider.     The patient was given a summary of these recommendations.     Shahab Meza, JordanD  Candidate    Telemedicine Visit Details  Type of service:  Telephone visit  Start Time: 9:33 AM  End Time: 10:01 AM  Originating Location (patient location): Home  Distant Location (provider location):  [facility] [place]        Medication Therapy Recommendations  Hallucinations    Current Medication: QUEtiapine (SEROQUEL) 25 MG tablet   Rationale: Undesirable effect - Adverse medication event - Safety   Recommendation: Change Administration Time - QUEtiapine 25 MG tablet - move dose to 8 pm   Status: Patient Agreed - Adherence/Education

## 2021-04-09 NOTE — PATIENT INSTRUCTIONS
Recommendations from today's MTM visit:                                                      1. Please continue rivastigmine 4.6 mg patch daily for 1 month then increase to 9.5 mg patch daily for 1 month, then increase to 13.3 mg patch daily. Follow up with Dr. Arriaza for ongoing management of Parkinson's disease but reach out to me with any questions via [employer].  2. Please continue to monitor blood pressure at home. You do not have to do this daily because Esperanza blood pressure seems to be good.   3. Complete the nitrofurantoin 100 mg twice daily for 7 days course. We are hopeful that Mervin will feel better, less weak, after the UTI is treated.   4. Please re-start melatonin at about 7 pm (at least an hour before bedtime) to help with sleep schedule. It may be better to give Mervin the Seroquel later in the evening (ie: 8 pm) so that he doesn't get too tired taking the medication earlier in the evening.  5. In the future, we may consider increasing Seroquel to help with Mervin's sleep and hallucinations.     Next MTM visit: as needed    *If Mervin ever has to go to the emergency department or hospital, once he is discharged I am able to meet with at NO CHARGE within 30 days of discharge so if you think of it - reach out to me should that occur and I can provide a free medication review.*    It was great to speak with you today.  I value your experience and would be very thankful for your time with providing feedback on our clinic survey. You may receive a survey via email or text message in the next few days.     To schedule another MTM appointment, please call the clinic directly or you may call the MTM scheduling line at [phone] or toll-free at [phone].     My Clinical Pharmacist's contact information:                                                      Please feel free to contact me with any questions or concerns you have.      Amy Salgado, Pharm.D.  Medication Therapy Management  Pharmacist  Phone: [phone]

## 2021-04-09 NOTE — Clinical Note
TRISTAN he has to pay out of pocket to see me so we did a short visit today but I won't be following up with them at this time. Of note, he is on an antibiotic as he has a UTI currently.

## 2021-05-09 ENCOUNTER — HEALTH MAINTENANCE LETTER (OUTPATIENT)
Age: 81
End: 2021-05-09

## 2021-05-17 NOTE — NURSING NOTE
Chief Complaint   Patient presents with     Consult     [facility]- CONSULT     Willow Diop MA    
altered mental status

## 2021-10-24 ENCOUNTER — HEALTH MAINTENANCE LETTER (OUTPATIENT)
Age: 81
End: 2021-10-24

## 2022-02-10 NOTE — TELEPHONE ENCOUNTER
FUTURE VISIT INFORMATION      FUTURE VISIT INFORMATION:    Date: 07/27/2018    Time:     Location:   REFERRAL INFORMATION:    Referring provider:  Self, Referred, MD    Referring providers clinic:      Reason for visit/diagnosis      Internal Records Epic, Care Everywhere and PACS    Pt is alert and oriented x4, pt received a smog enema, he does have hemorrhoids, so there was scant bloody drainage during the initial insertion, after that pt was placed on the bedside commode and he has an extremely large bowel movement that was light brown, brown and tan in colors, no more blood noted, is was a large soft or hard formed stool, pt tolerated well.  He expressed how much better he felt after his bowel movement was completed, per orders pt is stable for discharge to home    Iv out, cath intact, pressure and dressing applied    Pt son is helping him to get dressed

## 2022-06-05 ENCOUNTER — HEALTH MAINTENANCE LETTER (OUTPATIENT)
Age: 82
End: 2022-06-05

## 2022-10-15 ENCOUNTER — HEALTH MAINTENANCE LETTER (OUTPATIENT)
Age: 82
End: 2022-10-15

## 2023-06-11 ENCOUNTER — HEALTH MAINTENANCE LETTER (OUTPATIENT)
Age: 83
End: 2023-06-11